# Patient Record
Sex: FEMALE | Race: WHITE | URBAN - METROPOLITAN AREA
[De-identification: names, ages, dates, MRNs, and addresses within clinical notes are randomized per-mention and may not be internally consistent; named-entity substitution may affect disease eponyms.]

---

## 2017-09-14 PROCEDURE — 87086 URINE CULTURE/COLONY COUNT: CPT | Performed by: OBSTETRICS & GYNECOLOGY

## 2017-09-14 PROCEDURE — 87340 HEPATITIS B SURFACE AG IA: CPT | Performed by: OBSTETRICS & GYNECOLOGY

## 2017-09-14 PROCEDURE — 86900 BLOOD TYPING SEROLOGIC ABO: CPT | Performed by: OBSTETRICS & GYNECOLOGY

## 2017-09-14 PROCEDURE — 86901 BLOOD TYPING SEROLOGIC RH(D): CPT | Performed by: OBSTETRICS & GYNECOLOGY

## 2017-09-14 PROCEDURE — 86762 RUBELLA ANTIBODY: CPT | Performed by: OBSTETRICS & GYNECOLOGY

## 2017-09-14 PROCEDURE — 86850 RBC ANTIBODY SCREEN: CPT | Performed by: OBSTETRICS & GYNECOLOGY

## 2017-09-14 PROCEDURE — 87389 HIV-1 AG W/HIV-1&-2 AB AG IA: CPT | Performed by: OBSTETRICS & GYNECOLOGY

## 2017-09-14 PROCEDURE — 85025 COMPLETE CBC W/AUTO DIFF WBC: CPT | Performed by: OBSTETRICS & GYNECOLOGY

## 2017-09-14 PROCEDURE — 36415 COLL VENOUS BLD VENIPUNCTURE: CPT | Performed by: OBSTETRICS & GYNECOLOGY

## 2017-09-14 PROCEDURE — 87491 CHLMYD TRACH DNA AMP PROBE: CPT | Performed by: OBSTETRICS & GYNECOLOGY

## 2017-09-14 PROCEDURE — 87186 SC STD MICRODIL/AGAR DIL: CPT | Performed by: OBSTETRICS & GYNECOLOGY

## 2017-09-14 PROCEDURE — 87088 URINE BACTERIA CULTURE: CPT | Performed by: OBSTETRICS & GYNECOLOGY

## 2017-09-14 PROCEDURE — 86780 TREPONEMA PALLIDUM: CPT | Performed by: OBSTETRICS & GYNECOLOGY

## 2017-09-14 PROCEDURE — 87591 N.GONORRHOEAE DNA AMP PROB: CPT | Performed by: OBSTETRICS & GYNECOLOGY

## 2017-11-11 PROBLEM — Z34.00 SUPERVISION OF NORMAL FIRST PREGNANCY, ANTEPARTUM: Status: ACTIVE | Noted: 2017-11-11

## 2017-11-11 PROBLEM — Z87.440 H/O URINARY TRACT INFECTION: Status: ACTIVE | Noted: 2017-11-11

## 2017-11-11 PROBLEM — Z34.00 SUPERVISION OF NORMAL FIRST PREGNANCY, ANTEPARTUM (HCC): Status: ACTIVE | Noted: 2017-11-11

## 2017-11-11 PROBLEM — F41.9 ANXIETY: Status: ACTIVE | Noted: 2017-11-11

## 2017-11-11 PROCEDURE — 87086 URINE CULTURE/COLONY COUNT: CPT | Performed by: OBSTETRICS & GYNECOLOGY

## 2018-01-23 PROCEDURE — 36415 COLL VENOUS BLD VENIPUNCTURE: CPT | Performed by: OBSTETRICS & GYNECOLOGY

## 2018-01-23 PROCEDURE — 82950 GLUCOSE TEST: CPT | Performed by: OBSTETRICS & GYNECOLOGY

## 2018-02-19 PROCEDURE — 85025 COMPLETE CBC W/AUTO DIFF WBC: CPT | Performed by: OBSTETRICS & GYNECOLOGY

## 2018-02-19 PROCEDURE — 86780 TREPONEMA PALLIDUM: CPT | Performed by: OBSTETRICS & GYNECOLOGY

## 2018-02-19 PROCEDURE — 87389 HIV-1 AG W/HIV-1&-2 AB AG IA: CPT | Performed by: OBSTETRICS & GYNECOLOGY

## 2018-02-19 PROCEDURE — 36415 COLL VENOUS BLD VENIPUNCTURE: CPT | Performed by: OBSTETRICS & GYNECOLOGY

## 2018-04-04 PROBLEM — O99.013 ANEMIA AFFECTING PREGNANCY IN THIRD TRIMESTER: Status: ACTIVE | Noted: 2018-04-04

## 2018-04-04 PROBLEM — O99.013 ANEMIA AFFECTING PREGNANCY IN THIRD TRIMESTER (HCC): Status: ACTIVE | Noted: 2018-04-04

## 2018-04-04 PROCEDURE — 87653 STREP B DNA AMP PROBE: CPT | Performed by: OBSTETRICS & GYNECOLOGY

## 2018-04-04 PROCEDURE — 87081 CULTURE SCREEN ONLY: CPT | Performed by: OBSTETRICS & GYNECOLOGY

## 2018-04-23 PROBLEM — Z34.00 SUPERVISION OF NORMAL FIRST PREGNANCY, ANTEPARTUM: Status: RESOLVED | Noted: 2017-11-11 | Resolved: 2018-04-19

## 2018-04-23 PROBLEM — O99.013 ANEMIA AFFECTING PREGNANCY IN THIRD TRIMESTER: Status: RESOLVED | Noted: 2018-04-04 | Resolved: 2018-04-19

## 2018-04-23 PROBLEM — O99.013 ANEMIA AFFECTING PREGNANCY IN THIRD TRIMESTER (HCC): Status: RESOLVED | Noted: 2018-04-04 | Resolved: 2018-04-19

## 2018-04-23 PROBLEM — Z34.00 SUPERVISION OF NORMAL FIRST PREGNANCY, ANTEPARTUM (HCC): Status: RESOLVED | Noted: 2017-11-11 | Resolved: 2018-04-19

## 2021-02-10 PROBLEM — R25.1 TREMOR: Status: ACTIVE | Noted: 2021-02-10

## 2021-02-10 PROBLEM — J38.3 VOCAL CORD DYSFUNCTION: Status: ACTIVE | Noted: 2021-02-10

## 2021-02-10 PROBLEM — J45.909 ASTHMA: Status: ACTIVE | Noted: 2021-02-10

## 2021-02-10 PROBLEM — R59.0 LOCALIZED ENLARGED LYMPH NODES: Status: ACTIVE | Noted: 2021-02-10

## 2021-02-10 PROBLEM — K21.9 GERD (GASTROESOPHAGEAL REFLUX DISEASE): Status: RESOLVED | Noted: 2021-02-10 | Resolved: 2021-02-10

## 2021-02-10 PROBLEM — M54.50 LOWER BACK PAIN: Status: ACTIVE | Noted: 2021-02-10

## 2021-02-10 PROBLEM — E83.19 IRON OVERLOAD: Status: ACTIVE | Noted: 2021-02-10

## 2021-02-10 PROBLEM — K21.9 GERD (GASTROESOPHAGEAL REFLUX DISEASE): Status: ACTIVE | Noted: 2021-02-10

## 2021-02-10 PROBLEM — R25.9 ABNORMAL INVOLUNTARY MOVEMENT: Status: ACTIVE | Noted: 2021-02-10

## 2021-02-10 PROBLEM — R00.2 PALPITATIONS: Status: ACTIVE | Noted: 2021-02-10

## 2021-02-10 PROBLEM — N92.6 ABNORMAL MENSTRUAL CYCLE: Status: ACTIVE | Noted: 2021-02-10

## 2021-02-10 PROBLEM — E55.9 VITAMIN D DEFICIENCY: Status: ACTIVE | Noted: 2021-02-10

## 2021-02-10 PROBLEM — M94.0 COSTAL CHONDRITIS: Status: ACTIVE | Noted: 2021-02-10

## 2021-02-10 PROBLEM — J45.909 ASTHMA (HCC): Status: ACTIVE | Noted: 2021-02-10

## 2021-08-26 ENCOUNTER — APPOINTMENT (OUTPATIENT)
Dept: GENERAL RADIOLOGY | Facility: HOSPITAL | Age: 26
End: 2021-08-26
Payer: COMMERCIAL

## 2021-08-26 ENCOUNTER — HOSPITAL ENCOUNTER (EMERGENCY)
Facility: HOSPITAL | Age: 26
Discharge: HOME | End: 2021-08-26
Payer: COMMERCIAL

## 2021-08-26 VITALS
WEIGHT: 125 LBS | SYSTOLIC BLOOD PRESSURE: 109 MMHG | HEART RATE: 70 BPM | DIASTOLIC BLOOD PRESSURE: 74 MMHG | BODY MASS INDEX: 23 KG/M2 | TEMPERATURE: 98 F | RESPIRATION RATE: 23 BRPM | OXYGEN SATURATION: 98 %

## 2021-08-26 DIAGNOSIS — R00.0 TACHYCARDIA: Primary | ICD-10-CM

## 2021-08-26 PROCEDURE — 93005 ELECTROCARDIOGRAM TRACING: CPT

## 2021-08-26 PROCEDURE — 99284 EMERGENCY DEPT VISIT MOD MDM: CPT

## 2021-08-26 PROCEDURE — 93010 ELECTROCARDIOGRAM REPORT: CPT | Performed by: INTERNAL MEDICINE

## 2021-08-26 PROCEDURE — 71046 X-RAY EXAM CHEST 2 VIEWS: CPT

## 2021-08-26 NOTE — ED INITIAL ASSESSMENT (HPI)
Patient states she was awoken from her slumber two days ago with a fast heart rate and anxiety, states she was seen at her doctor's office and has had anxiety ever since

## 2021-08-26 NOTE — ED QUICK NOTES
Patient c/o palpitations waking her out of sleep 2 days ago. Saw her pcp after and was told she had a \"septal infarct but was probably a false positive\" and told to get it \"further evaluated. \" states palpitations resolved but still feeling anxious.  [place]

## 2021-08-26 NOTE — ED PROVIDER NOTES
Patient Seen in: [facility] Emergency Department      History   Patient presents with:  Chest Pain Angina    Stated Complaint: cp, saw pcp for same yesterday, ekg done in office     HPI/Subjective:   HPI    62-year-old female with past medical his above.    Physical Exam     ED Triage Vitals   BP 08/26/21 1032 129/83   Pulse 08/26/21 1032 76   Resp 08/26/21 1032 18   Temp 08/26/21 1032 98 °F (36.7 °C)   Temp src --    SpO2 08/26/21 1032 98 %   O2 Device 08/26/21 1117 None (Room air)       Current:BP patient and she is comfortable with discharge at this time and follow-up with her primary care physician.                              Disposition and Plan     Clinical Impression:  Tachycardia  (primary encounter diagnosis)     Disposition:  Discharge  8/2

## 2022-07-21 ENCOUNTER — HOSPITAL ENCOUNTER (OUTPATIENT)
Age: 27
Discharge: HOME | End: 2022-07-21
Attending: EMERGENCY MEDICINE
Payer: COMMERCIAL

## 2022-07-21 ENCOUNTER — APPOINTMENT (OUTPATIENT)
Dept: GENERAL RADIOLOGY | Age: 27
End: 2022-07-21
Attending: EMERGENCY MEDICINE
Payer: COMMERCIAL

## 2022-07-21 VITALS
HEART RATE: 96 BPM | RESPIRATION RATE: 20 BRPM | OXYGEN SATURATION: 100 % | TEMPERATURE: 98 F | DIASTOLIC BLOOD PRESSURE: 97 MMHG | SYSTOLIC BLOOD PRESSURE: 139 MMHG

## 2022-07-21 DIAGNOSIS — R05.2 SUBACUTE COUGH: Primary | ICD-10-CM

## 2022-07-21 DIAGNOSIS — N63.20 MASS OF LEFT BREAST, UNSPECIFIED QUADRANT: ICD-10-CM

## 2022-07-21 LAB
#MXD IC: 0.9 X10ˆ3/UL (ref 0.1–1)
BUN BLD-MCNC: 5 MG/DL (ref 7–18)
CHLORIDE BLD-SCNC: 105 MMOL/L (ref 98–112)
CO2 BLD-SCNC: 24 MMOL/L (ref 21–32)
CREAT BLD-MCNC: 0.8 MG/DL
DDIMER WHOLE BLOOD: <200 NG/ML DDU (ref ?–400)
GLUCOSE BLD-MCNC: 90 MG/DL (ref 70–99)
HCT VFR BLD AUTO: 41.9 %
HCT VFR BLD CALC: 42 %
HGB BLD-MCNC: 14.4 G/DL
ISTAT IONIZED CALCIUM FOR CHEM 8: 1.19 MMOL/L (ref 1.12–1.32)
LYMPHOCYTES # BLD AUTO: 1.2 X10ˆ3/UL (ref 1–4)
LYMPHOCYTES NFR BLD AUTO: 18.3 %
MCH RBC QN AUTO: 30.6 PG (ref 26–34)
MCHC RBC AUTO-ENTMCNC: 34.4 G/DL (ref 31–37)
MCV RBC AUTO: 89 FL (ref 80–100)
MIXED CELL %: 14.7 %
NEUTROPHILS # BLD AUTO: 4.3 X10ˆ3/UL (ref 1.5–7.7)
NEUTROPHILS NFR BLD AUTO: 67 %
PLATELET # BLD AUTO: 183 X10ˆ3/UL (ref 150–450)
POTASSIUM BLD-SCNC: 3.8 MMOL/L (ref 3.6–5.1)
RBC # BLD AUTO: 4.71 X10ˆ6/UL
SODIUM BLD-SCNC: 141 MMOL/L (ref 136–145)
TROPONIN I BLD-MCNC: <0.02 NG/ML
WBC # BLD AUTO: 6.4 X10ˆ3/UL (ref 4–11)

## 2022-07-21 PROCEDURE — 85378 FIBRIN DEGRADE SEMIQUANT: CPT | Performed by: EMERGENCY MEDICINE

## 2022-07-21 PROCEDURE — 71046 X-RAY EXAM CHEST 2 VIEWS: CPT | Performed by: EMERGENCY MEDICINE

## 2022-07-21 PROCEDURE — 36415 COLL VENOUS BLD VENIPUNCTURE: CPT

## 2022-07-21 PROCEDURE — 85025 COMPLETE CBC W/AUTO DIFF WBC: CPT | Performed by: EMERGENCY MEDICINE

## 2022-07-21 PROCEDURE — 80047 BASIC METABLC PNL IONIZED CA: CPT

## 2022-07-21 PROCEDURE — 84484 ASSAY OF TROPONIN QUANT: CPT

## 2022-07-21 PROCEDURE — 93005 ELECTROCARDIOGRAM TRACING: CPT

## 2022-07-21 PROCEDURE — 99214 OFFICE O/P EST MOD 30 MIN: CPT

## 2022-07-21 PROCEDURE — 93010 ELECTROCARDIOGRAM REPORT: CPT | Performed by: EMERGENCY MEDICINE

## 2022-07-21 PROCEDURE — 99215 OFFICE O/P EST HI 40 MIN: CPT

## 2022-07-21 RX ORDER — BENZONATATE 100 MG/1
100 CAPSULE ORAL 3 TIMES DAILY PRN
Qty: 30 CAPSULE | Refills: 0 | Status: SHIPPED | OUTPATIENT
Start: 2022-07-21 | End: 2022-08-20

## 2022-07-22 ENCOUNTER — HOSPITAL ENCOUNTER (EMERGENCY)
Facility: HOSPITAL | Age: 27
Discharge: HOME | End: 2022-07-22
Payer: COMMERCIAL

## 2022-07-22 VITALS
RESPIRATION RATE: 20 BRPM | HEIGHT: 62 IN | DIASTOLIC BLOOD PRESSURE: 74 MMHG | OXYGEN SATURATION: 97 % | HEART RATE: 91 BPM | BODY MASS INDEX: 22.08 KG/M2 | TEMPERATURE: 98 F | WEIGHT: 120 LBS | SYSTOLIC BLOOD PRESSURE: 119 MMHG

## 2022-07-22 DIAGNOSIS — J98.01 BRONCHOSPASM: Primary | ICD-10-CM

## 2022-07-22 LAB — B-HCG UR QL: NEGATIVE

## 2022-07-22 PROCEDURE — 81025 URINE PREGNANCY TEST: CPT

## 2022-07-22 PROCEDURE — 99284 EMERGENCY DEPT VISIT MOD MDM: CPT

## 2022-07-22 PROCEDURE — 94644 CONT INHLJ TX 1ST HOUR: CPT

## 2022-07-22 RX ORDER — PREDNISONE 20 MG/1
60 TABLET ORAL ONCE
Status: COMPLETED | OUTPATIENT
Start: 2022-07-22 | End: 2022-07-22

## 2022-07-22 RX ORDER — ALBUTEROL SULFATE 90 UG/1
2 AEROSOL, METERED RESPIRATORY (INHALATION) EVERY 4 HOURS PRN
Qty: 1 EACH | Refills: 0 | Status: SHIPPED | OUTPATIENT
Start: 2022-07-22 | End: 2022-08-21

## 2022-07-22 RX ORDER — PREDNISONE 20 MG/1
40 TABLET ORAL DAILY
Qty: 10 TABLET | Refills: 0 | Status: SHIPPED | OUTPATIENT
Start: 2022-07-22 | End: 2022-07-27

## 2022-07-22 RX ORDER — AZITHROMYCIN 250 MG/1
TABLET, FILM COATED ORAL
Qty: 6 TABLET | Refills: 0 | Status: SHIPPED | OUTPATIENT
Start: 2022-07-22 | End: 2022-07-27

## 2022-07-22 NOTE — ED PROVIDER NOTES
I reviewed that chart and discussed the case. I have examined the patient and noted nontoxic,well-appearing patient with trace wheezing resting comfortably and voicing profound symptomatic improvement versus initial presentation. 0413: Resting comfortably, amenable to discharge with symptomatic care and primary care follow-up. I agree with the following clinical impression(s):  Bronchospasm  (primary encounter diagnosis). I agree with the plan as noted.

## 2022-07-22 NOTE — ED INITIAL ASSESSMENT (HPI)
Patient arrives ambulatory through triage with c/o of  Cough, URI symptoms for the last 2 days. Patient states she was at urgent care earlier.

## 2023-01-25 ENCOUNTER — HOSPITAL ENCOUNTER (OUTPATIENT)
Dept: CT IMAGING | Age: 28
Discharge: HOME | End: 2023-01-25
Attending: NURSE PRACTITIONER
Payer: COMMERCIAL

## 2023-01-25 ENCOUNTER — HOSPITAL ENCOUNTER (OUTPATIENT)
Age: 28
Discharge: HOME | End: 2023-01-25
Payer: COMMERCIAL

## 2023-01-25 VITALS
DIASTOLIC BLOOD PRESSURE: 57 MMHG | TEMPERATURE: 98 F | HEART RATE: 87 BPM | SYSTOLIC BLOOD PRESSURE: 119 MMHG | OXYGEN SATURATION: 100 % | RESPIRATION RATE: 20 BRPM

## 2023-01-25 DIAGNOSIS — R51.9 LEFT-SIDED HEADACHE: Primary | ICD-10-CM

## 2023-01-25 LAB
#MXD IC: 1.3 X10ˆ3/UL (ref 0.1–1)
B-HCG UR QL: NEGATIVE
BUN BLD-MCNC: 14 MG/DL (ref 7–18)
CHLORIDE BLD-SCNC: 103 MMOL/L (ref 98–112)
CO2 BLD-SCNC: 24 MMOL/L (ref 21–32)
CREAT BLD-MCNC: 0.9 MG/DL
DDIMER WHOLE BLOOD: <200 NG/ML DDU (ref ?–400)
GFR SERPLBLD BASED ON 1.73 SQ M-ARVRAT: 91 ML/MIN/1.73M2 (ref 60–?)
GLUCOSE BLD-MCNC: 86 MG/DL (ref 70–99)
HCT VFR BLD AUTO: 39.1 %
HCT VFR BLD CALC: 41 %
HGB BLD-MCNC: 13.4 G/DL
ISTAT IONIZED CALCIUM FOR CHEM 8: 1.19 MMOL/L (ref 1.12–1.32)
LYMPHOCYTES # BLD AUTO: 2 X10ˆ3/UL (ref 1–4)
LYMPHOCYTES NFR BLD AUTO: 26.1 %
MCH RBC QN AUTO: 30.2 PG (ref 26–34)
MCHC RBC AUTO-ENTMCNC: 34.3 G/DL (ref 31–37)
MCV RBC AUTO: 88.1 FL (ref 80–100)
MIXED CELL %: 16.5 %
NEUTROPHILS # BLD AUTO: 4.5 X10ˆ3/UL (ref 1.5–7.7)
NEUTROPHILS NFR BLD AUTO: 57.4 %
PLATELET # BLD AUTO: 236 X10ˆ3/UL (ref 150–450)
POTASSIUM BLD-SCNC: 3.7 MMOL/L (ref 3.6–5.1)
RBC # BLD AUTO: 4.44 X10ˆ6/UL
SARS-COV-2 RNA RESP QL NAA+PROBE: NOT DETECTED
SODIUM BLD-SCNC: 140 MMOL/L (ref 136–145)
TROPONIN I BLD-MCNC: <0.02 NG/ML
WBC # BLD AUTO: 7.8 X10ˆ3/UL (ref 4–11)

## 2023-01-25 PROCEDURE — 99214 OFFICE O/P EST MOD 30 MIN: CPT

## 2023-01-25 PROCEDURE — 80047 BASIC METABLC PNL IONIZED CA: CPT

## 2023-01-25 PROCEDURE — 93005 ELECTROCARDIOGRAM TRACING: CPT

## 2023-01-25 PROCEDURE — 85378 FIBRIN DEGRADE SEMIQUANT: CPT | Performed by: NURSE PRACTITIONER

## 2023-01-25 PROCEDURE — 84484 ASSAY OF TROPONIN QUANT: CPT

## 2023-01-25 PROCEDURE — 85025 COMPLETE CBC W/AUTO DIFF WBC: CPT | Performed by: NURSE PRACTITIONER

## 2023-01-25 PROCEDURE — 36415 COLL VENOUS BLD VENIPUNCTURE: CPT

## 2023-01-25 PROCEDURE — 99215 OFFICE O/P EST HI 40 MIN: CPT

## 2023-01-25 PROCEDURE — 81025 URINE PREGNANCY TEST: CPT

## 2023-01-25 PROCEDURE — 70450 CT HEAD/BRAIN W/O DYE: CPT | Performed by: NURSE PRACTITIONER

## 2023-01-25 PROCEDURE — 93010 ELECTROCARDIOGRAM REPORT: CPT

## 2023-01-25 NOTE — ED INITIAL ASSESSMENT (HPI)
Left sided neck stiffness with headache for 2 days, heart palpitations, no sob, no fever, chills and feeling fatigued, no cough, no runny nose

## 2023-01-26 LAB
ATRIAL RATE: 98 BPM
P AXIS: 47 DEGREES
P-R INTERVAL: 112 MS
Q-T INTERVAL: 336 MS
QRS DURATION: 82 MS
QTC CALCULATION (BEZET): 428 MS
R AXIS: 50 DEGREES
T AXIS: 6 DEGREES
VENTRICULAR RATE: 98 BPM

## 2023-02-20 ENCOUNTER — HOSPITAL ENCOUNTER (OUTPATIENT)
Age: 28
Discharge: HOME | End: 2023-02-20
Payer: COMMERCIAL

## 2023-02-20 VITALS
HEART RATE: 83 BPM | DIASTOLIC BLOOD PRESSURE: 62 MMHG | OXYGEN SATURATION: 99 % | TEMPERATURE: 98 F | SYSTOLIC BLOOD PRESSURE: 102 MMHG | RESPIRATION RATE: 18 BRPM

## 2023-02-20 DIAGNOSIS — J02.0 STREP PHARYNGITIS: Primary | ICD-10-CM

## 2023-02-20 LAB — S PYO AG THROAT QL IA.RAPID: POSITIVE

## 2023-02-20 PROCEDURE — 87651 STREP A DNA AMP PROBE: CPT | Performed by: EMERGENCY MEDICINE

## 2023-02-20 PROCEDURE — 99213 OFFICE O/P EST LOW 20 MIN: CPT

## 2023-02-20 RX ORDER — PENICILLIN V POTASSIUM 500 MG/1
500 TABLET ORAL 2 TIMES DAILY
Qty: 20 TABLET | Refills: 0 | Status: SHIPPED | OUTPATIENT
Start: 2023-02-20 | End: 2023-03-02

## 2023-02-21 NOTE — DISCHARGE INSTRUCTIONS
Start the antibiotic as prescribed and finish it completely. After 24 hours get a new toothbrush so you do not reinfect yourself. Take ibuprofen or Tylenol for pain. Drink plenty of fluids eat as tolerated. Follow-up with your primary care doctor as needed.

## 2023-07-04 ENCOUNTER — HOSPITAL ENCOUNTER (OUTPATIENT)
Age: 28
Discharge: HOME | End: 2023-07-04
Payer: COMMERCIAL

## 2023-07-04 VITALS
OXYGEN SATURATION: 99 % | RESPIRATION RATE: 18 BRPM | DIASTOLIC BLOOD PRESSURE: 83 MMHG | HEART RATE: 107 BPM | SYSTOLIC BLOOD PRESSURE: 106 MMHG | TEMPERATURE: 98 F

## 2023-07-04 DIAGNOSIS — J02.9 VIRAL PHARYNGITIS: ICD-10-CM

## 2023-07-04 DIAGNOSIS — H92.02 LEFT EAR PAIN: Primary | ICD-10-CM

## 2023-07-04 LAB — S PYO AG THROAT QL: NEGATIVE

## 2023-07-04 PROCEDURE — 99213 OFFICE O/P EST LOW 20 MIN: CPT | Performed by: NURSE PRACTITIONER

## 2023-07-04 PROCEDURE — 87880 STREP A ASSAY W/OPTIC: CPT | Performed by: NURSE PRACTITIONER

## 2023-07-04 NOTE — ED INITIAL ASSESSMENT (HPI)
Left ear pain started today. No injury no trauma. Pain radiating to left side neck. No con getion. No fever.

## 2023-08-09 ENCOUNTER — HOSPITAL ENCOUNTER (OUTPATIENT)
Age: 28
Discharge: HOME | End: 2023-08-09
Payer: COMMERCIAL

## 2023-08-09 VITALS
OXYGEN SATURATION: 98 % | TEMPERATURE: 98 F | HEART RATE: 91 BPM | RESPIRATION RATE: 18 BRPM | SYSTOLIC BLOOD PRESSURE: 128 MMHG | DIASTOLIC BLOOD PRESSURE: 78 MMHG

## 2023-08-09 DIAGNOSIS — J02.0 ACUTE STREPTOCOCCAL PHARYNGITIS: Primary | ICD-10-CM

## 2023-08-09 DIAGNOSIS — Z87.09 HISTORY OF STREPTOCOCCAL SORE THROAT: ICD-10-CM

## 2023-08-09 LAB
S PYO AG THROAT QL: POSITIVE
SARS-COV-2 RNA RESP QL NAA+PROBE: NOT DETECTED

## 2023-08-09 PROCEDURE — U0002 COVID-19 LAB TEST NON-CDC: HCPCS | Performed by: PHYSICIAN ASSISTANT

## 2023-08-09 PROCEDURE — 99213 OFFICE O/P EST LOW 20 MIN: CPT | Performed by: PHYSICIAN ASSISTANT

## 2023-08-09 PROCEDURE — 87880 STREP A ASSAY W/OPTIC: CPT | Performed by: PHYSICIAN ASSISTANT

## 2023-08-09 RX ORDER — METHYLPREDNISOLONE 4 MG/1
TABLET ORAL
Qty: 1 EACH | Refills: 0 | Status: SHIPPED | OUTPATIENT
Start: 2023-08-09

## 2023-08-09 RX ORDER — AMOXICILLIN AND CLAVULANATE POTASSIUM 875; 125 MG/1; MG/1
1 TABLET, FILM COATED ORAL 2 TIMES DAILY
Qty: 20 TABLET | Refills: 0 | Status: SHIPPED | OUTPATIENT
Start: 2023-08-09 | End: 2023-08-19

## 2023-11-19 ENCOUNTER — HOSPITAL ENCOUNTER (OUTPATIENT)
Age: 28
Discharge: HOME | End: 2023-11-19
Payer: COMMERCIAL

## 2023-11-19 VITALS
OXYGEN SATURATION: 100 % | TEMPERATURE: 99 F | RESPIRATION RATE: 16 BRPM | DIASTOLIC BLOOD PRESSURE: 71 MMHG | HEART RATE: 70 BPM | SYSTOLIC BLOOD PRESSURE: 117 MMHG

## 2023-11-19 DIAGNOSIS — J02.0 ACUTE STREPTOCOCCAL PHARYNGITIS: Primary | ICD-10-CM

## 2023-11-19 LAB — S PYO AG THROAT QL IA.RAPID: POSITIVE

## 2023-11-19 PROCEDURE — 99213 OFFICE O/P EST LOW 20 MIN: CPT

## 2023-11-19 PROCEDURE — 87651 STREP A DNA AMP PROBE: CPT | Performed by: PHYSICIAN ASSISTANT

## 2023-11-19 RX ORDER — AMOXICILLIN 250 MG/5ML
500 POWDER, FOR SUSPENSION ORAL 2 TIMES DAILY
Qty: 200 ML | Refills: 0 | Status: SHIPPED | OUTPATIENT
Start: 2023-11-19 | End: 2023-11-29

## 2023-11-19 RX ORDER — BENZOCAINE/MENTH/CETYLPYRD CL 15 MG-2 MG
1 LOZENGE MUCOUS MEMBRANE EVERY 6 HOURS PRN
Qty: 20 LOZENGE | Refills: 0 | Status: SHIPPED | OUTPATIENT
Start: 2023-11-19 | End: 2023-12-09

## 2024-02-27 ENCOUNTER — HOSPITAL ENCOUNTER (OUTPATIENT)
Age: 29
Discharge: HOME | End: 2024-02-27

## 2024-02-27 VITALS
OXYGEN SATURATION: 99 % | DIASTOLIC BLOOD PRESSURE: 90 MMHG | RESPIRATION RATE: 18 BRPM | HEART RATE: 90 BPM | SYSTOLIC BLOOD PRESSURE: 127 MMHG | TEMPERATURE: 98 F

## 2024-02-27 DIAGNOSIS — J02.0 STREP PHARYNGITIS: Primary | ICD-10-CM

## 2024-02-27 LAB — S PYO AG THROAT QL IA.RAPID: POSITIVE

## 2024-02-27 PROCEDURE — 99213 OFFICE O/P EST LOW 20 MIN: CPT

## 2024-02-27 PROCEDURE — 87651 STREP A DNA AMP PROBE: CPT | Performed by: PHYSICIAN ASSISTANT

## 2024-02-27 PROCEDURE — 99214 OFFICE O/P EST MOD 30 MIN: CPT

## 2024-02-27 RX ORDER — AMOXICILLIN 400 MG/5ML
500 POWDER, FOR SUSPENSION ORAL 3 TIMES DAILY
Qty: 180 ML | Refills: 0 | Status: SHIPPED | OUTPATIENT
Start: 2024-02-27 | End: 2024-03-08

## 2024-02-27 RX ORDER — BENZOCAINE AND MENTHOL, UNSPECIFIED FORM 15; 2.3 MG/1; MG/1
1 LOZENGE ORAL 3 TIMES DAILY PRN
Qty: 16 LOZENGE | Refills: 0 | Status: SHIPPED | OUTPATIENT
Start: 2024-02-27

## 2024-02-27 RX ORDER — DEXAMETHASONE SODIUM PHOSPHATE 10 MG/ML
10 INJECTION, SOLUTION INTRAMUSCULAR; INTRAVENOUS ONCE
Status: COMPLETED | OUTPATIENT
Start: 2024-02-27 | End: 2024-02-27

## 2024-02-27 NOTE — ED INITIAL ASSESSMENT (HPI)
Patient arrived ambulatory to room c/o symptoms that started this morning. +sore throat. No fevers. No n/v/d. Easy non labored respirations. No distress.

## 2024-02-27 NOTE — ED PROVIDER NOTES
Chief Complaint   Patient presents with    Allergies     Left tonsil swollen, believe it is strep throat. - Entered by patient    Sore Throat       HPI:     Ne Calzada is a 28 year old female who presents for evaluation of sore throat onset overnight.  Notes associated hoarseness of voice.  Notes similar history of symptoms 3 months ago treated by confirmed strep results with amoxicillin with full course and full resolution of symptoms.  Denies history of recurrent strep otherwise or sick contacts including influenza or coronavirus.  Denies history of mono or recent exposure.  Denies associated headache nasal congestion earache dysphagia neck pain chest pain shortness of breath abdominal pain vomiting diarrhea dysuria or rash.      PFSH    PFSH asessment screens reviewed and agree.  Nurses notes reviewed I agree with documentation.    Family History   Problem Relation Age of Onset    Hypertension Father     Diabetes Father     Cancer Maternal Grandmother         colon    Cancer Paternal Grandmother         breast cancer in her 80s    Actinic Keratosis Mother     Other (GERD) Mother      Family history reviewed with patient/caregiver and is not pertinent to presenting problem.  Social History     Socioeconomic History    Marital status: Single     Spouse name: Not on file    Number of children: Not on file    Years of education: Not on file    Highest education level: Not on file   Occupational History    Not on file   Tobacco Use    Smoking status: Former     Passive exposure: Never    Smokeless tobacco: Never   Vaping Use    Vaping Use: Former   Substance and Sexual Activity    Alcohol use: No    Drug use: No    Sexual activity: Not on file   Other Topics Concern     Service Not Asked    Blood Transfusions Not Asked    Caffeine Concern Not Asked    Occupational Exposure Not Asked    Hobby Hazards Not Asked    Sleep Concern Not Asked    Stress Concern Not Asked    Weight Concern Not Asked    Special Diet  Not Asked    Back Care Not Asked    Exercise Not Asked    Bike Helmet Not Asked    Seat Belt Yes    Self-Exams Not Asked   Social History Narrative    Not on file     Social Determinants of Health     Financial Resource Strain: Not on file   Food Insecurity: Not on file   Transportation Needs: Not on file   Physical Activity: Not on file   Stress: Not on file   Social Connections: Not on file   Housing Stability: Not on file         ROS:   Positive for stated complaint: Sore throat.  Hoarseness.  All other systems reviewed and negative except as noted above.  Constitutional and Vital Signs Reviewed.      Physical Exam:     Findings:    /90   Pulse 90   Temp 97.8 °F (36.6 °C) (Temporal)   Resp 18   LMP 11/19/2023 (Exact Date)   SpO2 99%   GENERAL: well developed, well nourished, well hydrated, no distress  SKIN: good skin turgor, no obvious rashes  NECK: supple, no cervical lymphadenopathy or nuchal rigidity.  EXTREMITIES: no cyanosis or edema. GORDILLO without difficulty  HEAD: normocephalic, atraumatic  EYES: sclera non icteric bilateral, conjunctiva clear  EARS: TMs clear bilaterally. Canals clear.  NOSE: nasal turbinates: pink, normal mucosa  THROAT: Mild erythema posterior pharynx, tonsils +2 of 4 bilaterally.  Exudate right tonsil.  No PTA.   uvula midline, and airway patent  LUNGS: clear to auscultation bilaterally; no rales, rhonchi, or wheezes  NEURO: No focal deficits  PSYCH: Alert and oriented x3.  Answering questions appropriately.  Mood appropriate.    MDM/Assessment/Plan:   Orders for this encounter:    Orders Placed This Encounter    Rapid Strep A - ID NOW     Order Specific Question:   Release to patient     Answer:   Immediate    dexAMETHasone PF (Decadron) 10 MG/ML injection 10 mg    Benzocaine-Menthol (CEPACOL) 15-2.3 MG Mouth/Throat Lozenge     Sig: Use as directed 1 lozenge in the mouth or throat 3 (three) times daily as needed.     Dispense:  16 lozenge     Refill:  0    Amoxicillin 400  MG/5ML Oral Recon Susp     Sig: Take 6 mL (480 mg total) by mouth 3 (three) times daily for 10 days.     Dispense:  180 mL     Refill:  0       Labs performed this visit:  Recent Results (from the past 10 hour(s))   Rapid Strep A - ID NOW    Collection Time: 02/27/24  3:26 PM    Specimen: Throat; Other   Result Value Ref Range    Strep A by PCR Positive (A) Negative       MDM:  Patient given 1 dose of dexamethasone for support of tonsillitis as well as early laryngitis.  Patient strep positive.  Patient agrees with further antibiotics with previous use amoxicillin and to follow-up outpatient in the days ahead with supportive agents by recommendation.  Happy with plan of care    Diagnosis:    ICD-10-CM    1. Strep pharyngitis  J02.0           All results reviewed and discussed with patient.  See AVS for detailed discharge instructions for your condition today.    Follow Up with:  Yoanna Craig DO  [address] [facility]  [place] [address]  [phone]    Schedule an appointment as soon as possible for a visit in 3 days  As needed, If symptoms worsen

## 2024-07-25 ENCOUNTER — HOSPITAL ENCOUNTER (OUTPATIENT)
Age: 29
Discharge: HOME | End: 2024-07-25
Payer: COMMERCIAL

## 2024-07-25 VITALS
BODY MASS INDEX: 22 KG/M2 | SYSTOLIC BLOOD PRESSURE: 119 MMHG | WEIGHT: 121.25 LBS | RESPIRATION RATE: 18 BRPM | OXYGEN SATURATION: 100 % | HEART RATE: 92 BPM | DIASTOLIC BLOOD PRESSURE: 71 MMHG | TEMPERATURE: 99 F

## 2024-07-25 DIAGNOSIS — J02.0 STREP PHARYNGITIS: Primary | ICD-10-CM

## 2024-07-25 LAB — S PYO AG THROAT QL IA.RAPID: POSITIVE

## 2024-07-25 PROCEDURE — 99213 OFFICE O/P EST LOW 20 MIN: CPT

## 2024-07-25 PROCEDURE — 87651 STREP A DNA AMP PROBE: CPT | Performed by: STUDENT IN AN ORGANIZED HEALTH CARE EDUCATION/TRAINING PROGRAM

## 2024-07-25 RX ORDER — AMOXICILLIN 400 MG/5ML
500 POWDER, FOR SUSPENSION ORAL 3 TIMES DAILY
Qty: 180 ML | Refills: 0 | Status: SHIPPED | OUTPATIENT
Start: 2024-07-25 | End: 2024-08-04

## 2024-07-25 NOTE — ED INITIAL ASSESSMENT (HPI)
Patient requesting strep test; reports discomfort in throat x 3 days.  Also reports white patches in throat.

## 2024-07-25 NOTE — ED PROVIDER NOTES
Patient Seen in: [facility]      History     Chief Complaint   Patient presents with    Sore Throat     Stated Complaint: Headache, sore throat, strep exposure    Subjective:   HPI    29-year-old female presents to immediate care with complaints of headache sore throat and strep throat exposure.  Patient is afebrile.  She states she does have a history of strep pharyngitis.    Objective:   Past Medical History:    Anxiety    was on rxn for only one month; has been ok since then    Asthma ([facility])    Gastrointestinal disorder    GERD; presents as wheezing as self reported per pt; she uses an inhaler    Miscarriage ([facility])    Vitamin D deficiency              History reviewed. No pertinent surgical history.             Social History     Socioeconomic History    Marital status: Single   Tobacco Use    Smoking status: Former     Passive exposure: Never    Smokeless tobacco: Never   Vaping Use    Vaping status: Former   Substance and Sexual Activity    Alcohol use: No    Drug use: No   Other Topics Concern    Seat Belt Yes     Social Determinants of Health      Received from [facility]              Review of Systems    Positive for stated Chief Complaint: Sore Throat    Other systems are as noted in HPI.  Constitutional and vital signs reviewed.      All other systems reviewed and negative except as noted above.    Physical Exam     ED Triage Vitals [07/25/24 1459]   /71   Pulse 92   Resp 18   Temp 98.7 °F (37.1 °C)   Temp src Temporal   SpO2 100 %   O2 Device None (Room air)       Current Vitals:   Vital Signs  BP: 119/71  Pulse: 92  Resp: 18  Temp: 98.7 °F (37.1 °C)  Temp src: Temporal    Oxygen Therapy  SpO2: 100 %  O2 Device: None (Room air)            Physical Exam  Vitals reviewed.   Constitutional:       General: She is not in acute distress.     Appearance: She is not ill-appearing.   HENT:      Right Ear: Tympanic membrane and ear canal normal.      Left Ear: Tympanic membrane  and ear canal normal.      Nose: No congestion or rhinorrhea.      Mouth/Throat:      Mouth: Mucous membranes are moist. No oral lesions.      Pharynx: Uvula midline. Pharyngeal swelling and posterior oropharyngeal erythema present. No oropharyngeal exudate or uvula swelling.      Tonsils: No tonsillar exudate or tonsillar abscesses.   Cardiovascular:      Rate and Rhythm: Normal rate and regular rhythm.   Pulmonary:      Effort: Pulmonary effort is normal.      Breath sounds: Normal breath sounds.   Musculoskeletal:      Cervical back: Normal range of motion and neck supple.   Lymphadenopathy:      Cervical: Cervical adenopathy present.   Skin:     General: Skin is warm.   Neurological:      General: No focal deficit present.      Mental Status: She is alert and oriented to person, place, and time.   Psychiatric:         Mood and Affect: Mood normal.         Behavior: Behavior normal.               ED Course     Labs Reviewed   RAPID STREP A - Abnormal; Notable for the following components:       Result Value    Strep A by PCR Positive (*)     All other components within normal limits                      MDM                                         Medical Decision Making  29-year-old female presents with sore throat.  Differential diagnosis includes viral pharyngitis versus strep pharyngitis versus peritonsillar abscess.  POC strep is positive.  She does have erythema posterior pharynx.  Right tonsil is larger than left.  She has no acute distress.  Uvula is midline.  Prescription for amoxicillin was sent to patient's pharmacy.  She declined printed instructions.    Amount and/or Complexity of Data Reviewed  Labs: ordered.     Details: POC strep is positive    Risk  OTC drugs.  Prescription drug management.        Disposition and Plan     Clinical Impression:  1. Strep pharyngitis         Disposition:  Discharge  7/25/2024  3:17 pm    Follow-up:  Yoanna Craig DO  [address] [place] [address]  [place]  [id number]  [phone]      If symptoms worsen          Medications Prescribed:  Discharge Medication List as of 7/25/2024  3:18 PM        START taking these medications    Details   Amoxicillin 400 MG/5ML Oral Recon Susp Take 6 mL (480 mg total) by mouth 3 (three) times daily for 10 days., Normal, Disp-180 mL, R-0

## 2024-11-04 ENCOUNTER — HOSPITAL ENCOUNTER (EMERGENCY)
Facility: HOSPITAL | Age: 29
Discharge: HOME | End: 2024-11-04
Attending: EMERGENCY MEDICINE
Payer: COMMERCIAL

## 2024-11-04 ENCOUNTER — APPOINTMENT (OUTPATIENT)
Dept: GENERAL RADIOLOGY | Facility: HOSPITAL | Age: 29
End: 2024-11-04
Attending: EMERGENCY MEDICINE
Payer: COMMERCIAL

## 2024-11-04 VITALS
HEART RATE: 102 BPM | OXYGEN SATURATION: 94 % | BODY MASS INDEX: 23 KG/M2 | TEMPERATURE: 99 F | RESPIRATION RATE: 28 BRPM | SYSTOLIC BLOOD PRESSURE: 114 MMHG | DIASTOLIC BLOOD PRESSURE: 83 MMHG | WEIGHT: 125 LBS

## 2024-11-04 DIAGNOSIS — J45.901 MODERATE ASTHMA WITH EXACERBATION, UNSPECIFIED WHETHER PERSISTENT (HCC): Primary | ICD-10-CM

## 2024-11-04 PROCEDURE — 94799 UNLISTED PULMONARY SVC/PX: CPT

## 2024-11-04 PROCEDURE — 99284 EMERGENCY DEPT VISIT MOD MDM: CPT

## 2024-11-04 PROCEDURE — 94644 CONT INHLJ TX 1ST HOUR: CPT

## 2024-11-04 PROCEDURE — 71045 X-RAY EXAM CHEST 1 VIEW: CPT | Performed by: EMERGENCY MEDICINE

## 2024-11-04 RX ORDER — PREDNISONE 20 MG/1
40 TABLET ORAL DAILY
Qty: 8 TABLET | Refills: 0 | Status: SHIPPED | OUTPATIENT
Start: 2024-11-04 | End: 2024-11-08

## 2024-11-04 RX ORDER — ALBUTEROL SULFATE 5 MG/ML
15 SOLUTION RESPIRATORY (INHALATION) ONCE
Status: COMPLETED | OUTPATIENT
Start: 2024-11-04 | End: 2024-11-04

## 2024-11-04 RX ORDER — PREDNISONE 20 MG/1
60 TABLET ORAL ONCE
Status: COMPLETED | OUTPATIENT
Start: 2024-11-04 | End: 2024-11-04

## 2024-11-04 NOTE — ED INITIAL ASSESSMENT (HPI)
Patient ambulatory to ED with complaint of asthma exacerbation. Audible wheezing.      Patient is AXOX4.

## 2024-11-04 NOTE — ED PROVIDER NOTES
Patient Seen in: [facility] Emergency Department    History     Chief Complaint   Patient presents with    Asthma       HPI    29-year-old female who presents to the emergency department given day of dyspnea, dry cough.  No chest pain or any fevers.  No abdominal pain or nausea or emesis.    History reviewed.   Past Medical History:    Anxiety    was on rxn for only one month; has been ok since then    Asthma (HCC)    Gastrointestinal disorder    GERD; presents as wheezing as self reported per pt; she uses an inhaler    Miscarriage (HCC)    Vitamin D deficiency       History reviewed. History reviewed. No pertinent surgical history.      Medications :  Prescriptions Prior to Admission[1]     Family History   Problem Relation Age of Onset    Hypertension Father     Diabetes Father     Cancer Maternal Grandmother         colon    Cancer Paternal Grandmother         breast cancer in her 80s    Actinic Keratosis Mother     Other (GERD) Mother        Smoking Status:   Social History     Socioeconomic History    Marital status: Single   Tobacco Use    Smoking status: Former     Passive exposure: Never    Smokeless tobacco: Never   Vaping Use    Vaping status: Former   Substance and Sexual Activity    Alcohol use: No    Drug use: No   Other Topics Concern    Seat Belt Yes       Constitutional and vital signs reviewed.      Social History and Family History elements reviewed from today, pertinent positives to the presenting problem noted.    Physical Exam     ED Triage Vitals [11/04/24 0851]   /83   Pulse 102   Resp (!) 28   Temp 98.8 °F (37.1 °C)   Temp src Temporal   SpO2 94 %   O2 Device None (Room air)       All measures to prevent infection transmission during my interaction with the patient were taken. The patient was already wearing a droplet mask on my arrival to the room. Personal protective equipment was worn throughout the duration of the exam.  Handwashing was performed prior to and after the  exam.  Stethoscope and any equipment used during my examination was cleaned with super sani-cloth germicidal wipes following the exam.     Physical Exam    General: NAD  Head: Normocephalic and atraumatic.  Mouth/Throat/Ears/Nose: Oropharynx is clear   Eyes: Conjunctivae and EOM are normal.   Neck: Normal range of motion. Supple.   Cardiovascular: Normal rate, regular rhythm, normal heart sounds.  Respiratory/Chest: wheezing in all lung fields. Trace tachypnea. No accessory muscle use. Exhibits no tenderness.  Gastrointestinal: Soft, non-tender, non-distended. Bowel sounds are normal.   Musculoskeletal:No swelling or deformity.   Neurological: Alert and appropriate. No focal deficits.   Skin: Skin is warm and dry. No pallor.  Psychiatric: Has a normal mood and affect.      ED Course      Labs Reviewed - No data to display    As Interpreted by me    Imaging Results Available and Reviewed while in ED: XR CHEST AP PORTABLE  (CPT=71045)    Result Date: 11/4/2024  CONCLUSION: No acute cardiopulmonary disease.    Dictated by (CST): Rory Cali MD on 11/04/2024 at 9:40 AM     Finalized by (CST): Rory Cali MD on 11/04/2024 at 9:41 AM         ED Medications Administered:   Medications   albuterol (Ventolin) (5 MG/ML) 0.5% nebulizer solution 15 mg (15 mg Nebulization Given 11/4/24 0906)   predniSONE (Deltasone) tab 60 mg (60 mg Oral Given 11/4/24 0902)         MDM     Vitals:    11/04/24 0851   BP: 114/83   Pulse: 102   Resp: (!) 28   Temp: 98.8 °F (37.1 °C)   TempSrc: Temporal   SpO2: 94%   Weight: 56.7 kg     *I personally reviewed and interpreted all ED vitals.    Pulse Ox: 94%, Room air, Normal     Monitor Interpretation:   normal sinus rhythm as interpreted by me.  The cardiac monitor was ordered given dyspnea.      Medical Decision Making      Differential Diagnosis/ Diagnostic Considerations: asthma exacerbation, pneumonia     Complicating Factors: The patient already has asthma to contribute to the  complexity of this ED evaluation.    I reviewed prior chart records including office visit note from October 2, 2024.  Chest x-ray unremarkable for infiltrate to suggest pneumonia on my interpretation.       Considered admission however  provided with hour-long duo nebulizers and patient is appearing much improved.  Prednisone dose provided in the emergency department.  She is normoxic on room air and demonstrates no signs of respiratory failure    Prescriptions: Prednisone as below    Dc In stable condition.  Patient is comfortable with the plan.    I spent 30 minutes of critical care time caring for this patient. This does not include time spent on separately reported billable procedures.       Disposition and Plan     Clinical Impression:  1. Moderate asthma with exacerbation, unspecified whether persistent (HCC)        Disposition:  Discharge    Follow-up:  Yoanna Craig DO  [address] [facility] [address] [phone]    Schedule an appointment as soon as possible for a visit in 1 day(s)        Medications Prescribed:  Discharge Medication List as of 11/4/2024 10:26 AM        START taking these medications    Details   predniSONE 20 MG Oral Tab Take 2 tablets (40 mg total) by mouth daily for 4 days., Normal, Disp-8 tablet, R-0                              [1] (Not in a hospital admission)

## 2024-12-11 ENCOUNTER — HOSPITAL ENCOUNTER (OUTPATIENT)
Age: 29
Discharge: HOME | End: 2024-12-11
Payer: COMMERCIAL

## 2024-12-11 VITALS
SYSTOLIC BLOOD PRESSURE: 121 MMHG | DIASTOLIC BLOOD PRESSURE: 77 MMHG | RESPIRATION RATE: 12 BRPM | HEART RATE: 78 BPM | TEMPERATURE: 98 F | OXYGEN SATURATION: 100 %

## 2024-12-11 DIAGNOSIS — R31.9 URINARY TRACT INFECTION WITH HEMATURIA, SITE UNSPECIFIED: Primary | ICD-10-CM

## 2024-12-11 DIAGNOSIS — J02.9 ACUTE PHARYNGITIS, UNSPECIFIED ETIOLOGY: ICD-10-CM

## 2024-12-11 DIAGNOSIS — N39.0 URINARY TRACT INFECTION WITH HEMATURIA, SITE UNSPECIFIED: Primary | ICD-10-CM

## 2024-12-11 LAB
B-HCG UR QL: NEGATIVE
BILIRUB UR QL STRIP: NEGATIVE
CLARITY UR: CLEAR
COLOR UR: YELLOW
GLUCOSE UR STRIP-MCNC: NEGATIVE MG/DL
KETONES UR STRIP-MCNC: NEGATIVE MG/DL
NITRITE UR QL STRIP: NEGATIVE
PH UR STRIP: 7 [PH]
PROT UR STRIP-MCNC: NEGATIVE MG/DL
S PYO AG THROAT QL IA.RAPID: NEGATIVE
SP GR UR STRIP: 1.01
UROBILINOGEN UR STRIP-ACNC: <2 MG/DL

## 2024-12-11 PROCEDURE — 99214 OFFICE O/P EST MOD 30 MIN: CPT

## 2024-12-11 PROCEDURE — 81002 URINALYSIS NONAUTO W/O SCOPE: CPT

## 2024-12-11 PROCEDURE — 81025 URINE PREGNANCY TEST: CPT

## 2024-12-11 PROCEDURE — 87077 CULTURE AEROBIC IDENTIFY: CPT | Performed by: PHYSICIAN ASSISTANT

## 2024-12-11 PROCEDURE — 87086 URINE CULTURE/COLONY COUNT: CPT | Performed by: PHYSICIAN ASSISTANT

## 2024-12-11 PROCEDURE — 87186 SC STD MICRODIL/AGAR DIL: CPT | Performed by: PHYSICIAN ASSISTANT

## 2024-12-11 PROCEDURE — 87651 STREP A DNA AMP PROBE: CPT | Performed by: PHYSICIAN ASSISTANT

## 2024-12-11 RX ORDER — CEPHALEXIN 500 MG/1
500 CAPSULE ORAL 3 TIMES DAILY
Qty: 21 CAPSULE | Refills: 0 | Status: SHIPPED | OUTPATIENT
Start: 2024-12-11 | End: 2024-12-18

## 2024-12-11 NOTE — ED PROVIDER NOTES
Patient Seen in: [facility]    History     Chief Complaint   Patient presents with    Urinary Symptoms     Ammonia smell in urine. Also white patches on right tonsil began around 12 today. - Entered by patient    Sore Throat     Stated Complaint: Urinary Symptoms - Ammonia smell in urine. Also white patches on right tonsil b*    HPI    Ne Calzada is a 29 year old female who presents with chief complaint of malodorous urine.  Onset 4 days ago.  Patient reports associated dysuria.  Patient reports history of urinary tract infection and states her symptoms are similar.  Patient states she noticed right-sided tonsillar exudates today.  Patient states that her daughter was recently diagnosed with strep throat. Patient denies fever, chills, abdominal pain, nausea, vomiting, diarrhea, constipation, melena, hematochezia, flank pain, vaginal bleeding, vaginal discharge, earache, nasal drainage, trismus, drooling, cough, rash, neck pain, neck swelling, restricted neck movement.          Past Medical History:    Anxiety    was on rxn for only one month; has been ok since then    Asthma (HCC)    Gastrointestinal disorder    GERD; presents as wheezing as self reported per pt; she uses an inhaler    Miscarriage (HCC)    Vitamin D deficiency       No past surgical history on file.         Family History   Problem Relation Age of Onset    Hypertension Father     Diabetes Father     Cancer Maternal Grandmother         colon    Cancer Paternal Grandmother         breast cancer in her 80s    Actinic Keratosis Mother     Other (GERD) Mother        Social History     Socioeconomic History    Marital status: Single   Tobacco Use    Smoking status: Former     Passive exposure: Never    Smokeless tobacco: Never   Vaping Use    Vaping status: Former   Substance and Sexual Activity    Alcohol use: No    Drug use: No   Other Topics Concern    Seat Belt Yes     Social Drivers of Health      Received from [facility],  [facility]       Review of Systems    Positive for stated complaint: Urinary Symptoms - Ammonia smell in urine. Also white patches on right tonsil b*  Other systems are as noted in HPI.  Constitutional and vital signs reviewed.      All other systems reviewed and negative except as noted above.    PSFH elements reviewed from today and agreed except as otherwise stated in HPI.    Physical Exam     ED Triage Vitals [12/11/24 1529]   /77   Pulse 78   Resp 12   Temp 97.8 °F (36.6 °C)   Temp src Oral   SpO2 100 %   O2 Device None (Room air)       Current:/77   Pulse 78   Temp 97.8 °F (36.6 °C) (Oral)   Resp 12   LMP 11/17/2024 (Exact Date)   SpO2 100%     PULSE OX within normal limits on room air as interpreted by this provider.        Physical Exam    Constitutional: The patient is cooperative. Appears well-developed and well-nourished.  No acute distress.  Psychological: Alert, No abnormalities of mood, affect.  Head: Normocephalic/atraumatic.  Eyes: Pupils are equal round reactive to light.  Conjunctiva are within normal limits.  ENT: Pharynx injected.  Tonsils 1+ bilaterally.  No tonsillar exudates.  Uvula midline.  No trismus.  No drooling.  TMs within normal limits bilaterally.  Mucous membranes moist.  Neck: The neck is supple.  No meningeal signs.  Chest: There is no tenderness to the chest wall.  No CVA tenderness bilaterally.  Respiratory: Respiratory effort was normal.  There is no stridor.  Air entry is equal.  Cardiovascular: Regular rate and rhythm.  Capillary refill is brisk.    Gastrointestinal: Abdomen soft, nontender, nondistended.  There is no rebound tenderness or guarding.  No organomegaly is noted. No peritoneal signs.  Normal bowel sounds.  Genitourinary: Not examined.  Lymphatic: No gross lymphadenopathy noted.  Musculoskeletal: Musculoskeletal system is grossly intact.  There is no obvious deformity.  Neurological: Gross motor movement is intact in all 4  extremities.  Patient exhibits normal speech.  Skin: Skin is normal to inspection.  Warm and dry.  No obvious bruising.  No obvious rash.          ED Course     Labs Reviewed   [facility] POCT URINALYSIS DIPSTICK - Abnormal; Notable for the following components:       Result Value    Blood, Urine Trace-Intact (*)     Leukocyte esterase urine Trace (*)     All other components within normal limits   POCT PREGNANCY URINE - Normal   RAPID STREP A - Normal   URINE CULTURE, ROUTINE       MDM     Differential diagnosis including but not limited to UTI, ureterolithiasis, vaginitis, urethritis, URI, strep pharyngitis, viral pharyngitis    Urine dip demonstrates trace-intact blood and trace leukocyte esterase.  Urine culture pending.  Will treat empirically with Keflex.    Rapid strep negative.    Physical exam remained stable as previously documented.  Available results reviewed with patient.    I have given the patient instructions regarding their diagnoses, expectations, follow up, and ER precautions. I explained to the patient that emergent conditions may arise and to go to the ER for new, worsening or any persistent conditions. I've explained the importance of following up with their doctor as instructed. The patient verbalized understanding of the discharge instructions and plan.      Disposition and Plan     Clinical Impression:  1. Urinary tract infection with hematuria, site unspecified    2. Acute pharyngitis, unspecified etiology        Disposition:  Discharge    Follow-up:  Yoanna Craig DO  [address] [place] [id number] [phone]    Call in 1 day  For follow-up      Medications Prescribed:  Current Discharge Medication List        START taking these medications    Details   cephALEXin (KEFLEX) 500 MG Oral Cap Take 1 capsule (500 mg total) by mouth 3 (three) times daily for 7 days.  Qty: 21 capsule, Refills: 0

## 2025-01-18 ENCOUNTER — HOSPITAL ENCOUNTER (EMERGENCY)
Facility: HOSPITAL | Age: 30
Discharge: HOME | End: 2025-01-18
Attending: EMERGENCY MEDICINE
Payer: COMMERCIAL

## 2025-01-18 VITALS
OXYGEN SATURATION: 96 % | SYSTOLIC BLOOD PRESSURE: 137 MMHG | BODY MASS INDEX: 22.66 KG/M2 | TEMPERATURE: 98 F | WEIGHT: 120 LBS | HEIGHT: 61 IN | HEART RATE: 106 BPM | RESPIRATION RATE: 20 BRPM | DIASTOLIC BLOOD PRESSURE: 96 MMHG

## 2025-01-18 DIAGNOSIS — R06.02 SHORTNESS OF BREATH: Primary | ICD-10-CM

## 2025-01-18 PROCEDURE — 99283 EMERGENCY DEPT VISIT LOW MDM: CPT
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
## 2025-02-10 ENCOUNTER — HOSPITAL ENCOUNTER (EMERGENCY)
Facility: HOSPITAL | Age: 30
Discharge: HOME | End: 2025-02-10
Payer: COMMERCIAL

## 2025-02-10 VITALS
HEART RATE: 105 BPM | RESPIRATION RATE: 20 BRPM | SYSTOLIC BLOOD PRESSURE: 132 MMHG | OXYGEN SATURATION: 100 % | TEMPERATURE: 98 F | DIASTOLIC BLOOD PRESSURE: 87 MMHG

## 2025-02-10 DIAGNOSIS — J45.41 MODERATE PERSISTENT ASTHMA WITH ACUTE EXACERBATION (HCC): Primary | ICD-10-CM

## 2025-02-10 LAB — B-HCG UR QL: NEGATIVE

## 2025-02-10 PROCEDURE — 99284 EMERGENCY DEPT VISIT MOD MDM: CPT

## 2025-02-10 PROCEDURE — 81025 URINE PREGNANCY TEST: CPT

## 2025-02-10 PROCEDURE — 94640 AIRWAY INHALATION TREATMENT: CPT

## 2025-02-10 RX ORDER — ALBUTEROL SULFATE 5 MG/ML
10 SOLUTION RESPIRATORY (INHALATION) ONCE
Status: COMPLETED | OUTPATIENT
Start: 2025-02-10 | End: 2025-02-10

## 2025-02-10 RX ORDER — ALBUTEROL SULFATE 90 UG/1
2 INHALANT RESPIRATORY (INHALATION) EVERY 4 HOURS PRN
Qty: 1 EACH | Refills: 0 | Status: SHIPPED | OUTPATIENT
Start: 2025-02-10 | End: 2025-03-12

## 2025-02-10 RX ORDER — PREDNISONE 20 MG/1
40 TABLET ORAL DAILY
Qty: 10 TABLET | Refills: 0 | Status: SHIPPED | OUTPATIENT
Start: 2025-02-10 | End: 2025-02-15

## 2025-02-10 RX ORDER — IPRATROPIUM BROMIDE AND ALBUTEROL SULFATE 2.5; .5 MG/3ML; MG/3ML
3 SOLUTION RESPIRATORY (INHALATION) EVERY 6 HOURS PRN
Status: DISCONTINUED | OUTPATIENT
Start: 2025-02-10 | End: 2025-02-10

## 2025-02-10 RX ORDER — PREDNISONE 20 MG/1
60 TABLET ORAL ONCE
Status: COMPLETED | OUTPATIENT
Start: 2025-02-10 | End: 2025-02-10

## 2025-02-10 NOTE — ED INITIAL ASSESSMENT (HPI)
Patient arrives to ER evaluation asthma Exacerbation. Patient states she had difficult since last night and has used her rescue inhaler 10 times with no relief.

## 2025-02-10 NOTE — ED PROVIDER NOTES
Patient Seen in: [facility] Emergency Department      History     Chief Complaint   Patient presents with    Difficulty Breathing     Stated Complaint: Asthma Exacerbation    Subjective:   28yo/f w hx of anxiety, asthma,GERD reports to the ED w c/o asthma exacerbation/wheezing. Started yesterday. Not relieved w inhaler. No fever. No sick contacts. No vomiting. No chest pain. Wheezing w/o productive cough.                 Objective:     Past Medical History:    Anxiety    was on rxn for only one month; has been ok since then    Asthma (HCC)    Gastrointestinal disorder    GERD; presents as wheezing as self reported per pt; she uses an inhaler    Miscarriage (HCC)    Vitamin D deficiency              History reviewed. No pertinent surgical history.             Social History     Socioeconomic History    Marital status: Single   Tobacco Use    Smoking status: Former     Passive exposure: Never    Smokeless tobacco: Never   Vaping Use    Vaping status: Some Days   Substance and Sexual Activity    Alcohol use: Not Currently     Comment: social    Drug use: No   Other Topics Concern    Seat Belt Yes     Social Drivers of Health      Received from [facility]                  Physical Exam     ED Triage Vitals   BP 02/10/25 1244 123/79   Pulse 02/10/25 1244 102   Resp 02/10/25 1346 20   Temp 02/10/25 1244 98.4 °F (36.9 °C)   Temp src 02/10/25 1244 Oral   SpO2 02/10/25 1244 99 %   O2 Device 02/10/25 1244 None (Room air)       Current Vitals:   Vital Signs  BP: 121/87  Pulse: (!) 122  Resp: 20  Temp: 98.4 °F (36.9 °C)  Temp src: Oral    Oxygen Therapy  SpO2: 97 %  O2 Device: None (Room air)        Physical Exam  Vitals and nursing note reviewed.   Constitutional:       General: She is not in acute distress.     Appearance: She is well-developed.   HENT:      Head: Normocephalic and atraumatic.      Nose: Nose normal.      Mouth/Throat:      Mouth: Mucous membranes are moist.   Eyes:       Conjunctiva/sclera: Conjunctivae normal.      Pupils: Pupils are equal, round, and reactive to light.   Cardiovascular:      Rate and Rhythm: Normal rate and regular rhythm.      Heart sounds: Normal heart sounds.   Pulmonary:      Effort: Pulmonary effort is normal.      Breath sounds: Examination of the right-lower field reveals wheezing. Examination of the left-lower field reveals wheezing. Wheezing present.   Abdominal:      General: Bowel sounds are normal.      Palpations: Abdomen is soft.   Musculoskeletal:         General: No tenderness or deformity. Normal range of motion.      Cervical back: Normal range of motion and neck supple.   Skin:     General: Skin is warm and dry.      Capillary Refill: Capillary refill takes less than 2 seconds.      Findings: No rash.      Comments: Normal color   Neurological:      General: No focal deficit present.      Mental Status: She is alert and oriented to person, place, and time.      GCS: GCS eye subscore is 4. GCS verbal subscore is 5. GCS motor subscore is 6.      Cranial Nerves: No cranial nerve deficit.      Gait: Gait normal.             ED Course   Labs Reviewed - No data to display                MDM              Medical Decision Making  30yo/f w hx and exam as stated; cough/wheezing    Afebrile  Hemodynamically stable  No vomiting  Wheezin resolved w nebs  No chest pain  No systemic symptoms    Plan  Dc to home  Close fu      Risk  OTC drugs.  Prescription drug management.        Disposition and Plan     Clinical Impression:  1. Moderate persistent asthma with acute exacerbation (HCC)         Disposition:  Discharge  2/10/2025  2:38 pm    Follow-up:  Yoanna rCaig DO  [address] [facility] [address]  [phone]    Follow up in 2 day(s)            Medications Prescribed:  Current Discharge Medication List        START taking these medications    Details   !! albuterol 108 (90 Base) MCG/ACT Inhalation Aero Soln Inhale 2 puffs into the lungs every 4 (four)  hours as needed.  Qty: 1 each, Refills: 0      predniSONE 20 MG Oral Tab Take 2 tablets (40 mg total) by mouth daily for 5 days.  Qty: 10 tablet, Refills: 0       !! - Potential duplicate medications found. Please discuss with provider.              Supplementary Documentation:

## 2025-03-26 ENCOUNTER — APPOINTMENT (OUTPATIENT)
Dept: GENERAL RADIOLOGY | Facility: HOSPITAL | Age: 30
End: 2025-03-26
Attending: EMERGENCY MEDICINE
Payer: COMMERCIAL

## 2025-03-26 ENCOUNTER — HOSPITAL ENCOUNTER (OUTPATIENT)
Facility: HOSPITAL | Age: 30
Setting detail: OBSERVATION
Discharge: HOME | End: 2025-03-28
Attending: EMERGENCY MEDICINE | Admitting: HOSPITALIST
Payer: COMMERCIAL

## 2025-03-26 ENCOUNTER — APPOINTMENT (OUTPATIENT)
Dept: CT IMAGING | Facility: HOSPITAL | Age: 30
End: 2025-03-26
Attending: EMERGENCY MEDICINE
Payer: COMMERCIAL

## 2025-03-26 ENCOUNTER — HOSPITAL ENCOUNTER (EMERGENCY)
Facility: HOSPITAL | Age: 30
Discharge: HOME | End: 2025-03-26
Attending: EMERGENCY MEDICINE
Payer: COMMERCIAL

## 2025-03-26 VITALS
DIASTOLIC BLOOD PRESSURE: 78 MMHG | HEART RATE: 100 BPM | OXYGEN SATURATION: 100 % | TEMPERATURE: 98 F | RESPIRATION RATE: 24 BRPM | SYSTOLIC BLOOD PRESSURE: 118 MMHG | BODY MASS INDEX: 24 KG/M2 | WEIGHT: 125 LBS

## 2025-03-26 DIAGNOSIS — J45.901 EXACERBATION OF ASTHMA, UNSPECIFIED ASTHMA SEVERITY, UNSPECIFIED WHETHER PERSISTENT (HCC): Primary | ICD-10-CM

## 2025-03-26 LAB
ANION GAP SERPL CALC-SCNC: 10 MMOL/L (ref 0–18)
BUN BLD-MCNC: 16 MG/DL (ref 9–23)
BUN/CREAT SERPL: 16.3 (ref 10–20)
CALCIUM BLD-MCNC: 9.2 MG/DL (ref 8.7–10.4)
CHLORIDE SERPL-SCNC: 105 MMOL/L (ref 98–112)
CO2 SERPL-SCNC: 20 MMOL/L (ref 21–32)
CREAT BLD-MCNC: 0.98 MG/DL
D DIMER PPP FEU-MCNC: 0.51 UG/ML FEU (ref ?–0.5)
EGFRCR SERPLBLD CKD-EPI 2021: 81 ML/MIN/1.73M2 (ref 60–?)
FLUAV + FLUBV RNA SPEC NAA+PROBE: NEGATIVE
FLUAV + FLUBV RNA SPEC NAA+PROBE: NEGATIVE
GLUCOSE BLD-MCNC: 171 MG/DL (ref 70–99)
HCG SERPL QL: NEGATIVE
MAGNESIUM SERPL-MCNC: 2.2 MG/DL (ref 1.6–2.6)
OSMOLALITY SERPL CALC.SUM OF ELEC: 285 MOSM/KG (ref 275–295)
POTASSIUM SERPL-SCNC: 4.7 MMOL/L (ref 3.5–5.1)
RSV RNA SPEC NAA+PROBE: NEGATIVE
SARS-COV-2 RNA RESP QL NAA+PROBE: NOT DETECTED
SODIUM SERPL-SCNC: 135 MMOL/L (ref 136–145)

## 2025-03-26 PROCEDURE — 0241U SARS-COV-2/FLU A AND B/RSV BY PCR (GENEXPERT): CPT | Performed by: EMERGENCY MEDICINE

## 2025-03-26 PROCEDURE — 71045 X-RAY EXAM CHEST 1 VIEW: CPT | Performed by: EMERGENCY MEDICINE

## 2025-03-26 PROCEDURE — 96365 THER/PROPH/DIAG IV INF INIT: CPT

## 2025-03-26 PROCEDURE — 94799 UNLISTED PULMONARY SVC/PX: CPT

## 2025-03-26 PROCEDURE — 85025 COMPLETE CBC W/AUTO DIFF WBC: CPT | Performed by: EMERGENCY MEDICINE

## 2025-03-26 PROCEDURE — 71260 CT THORAX DX C+: CPT | Performed by: EMERGENCY MEDICINE

## 2025-03-26 PROCEDURE — 96375 TX/PRO/DX INJ NEW DRUG ADDON: CPT

## 2025-03-26 PROCEDURE — 80048 BASIC METABOLIC PNL TOTAL CA: CPT | Performed by: EMERGENCY MEDICINE

## 2025-03-26 PROCEDURE — 99285 EMERGENCY DEPT VISIT HI MDM: CPT

## 2025-03-26 PROCEDURE — 84703 CHORIONIC GONADOTROPIN ASSAY: CPT | Performed by: EMERGENCY MEDICINE

## 2025-03-26 PROCEDURE — 94645 CONT INHLJ TX EACH ADDL HOUR: CPT

## 2025-03-26 PROCEDURE — 94644 CONT INHLJ TX 1ST HOUR: CPT

## 2025-03-26 PROCEDURE — 99284 EMERGENCY DEPT VISIT MOD MDM: CPT

## 2025-03-26 PROCEDURE — 94640 AIRWAY INHALATION TREATMENT: CPT

## 2025-03-26 PROCEDURE — 85379 FIBRIN DEGRADATION QUANT: CPT | Performed by: EMERGENCY MEDICINE

## 2025-03-26 PROCEDURE — 83735 ASSAY OF MAGNESIUM: CPT | Performed by: EMERGENCY MEDICINE

## 2025-03-26 RX ORDER — ALBUTEROL SULFATE 5 MG/ML
10 SOLUTION RESPIRATORY (INHALATION) CONTINUOUS
Status: DISCONTINUED | OUTPATIENT
Start: 2025-03-26 | End: 2025-03-26

## 2025-03-26 RX ORDER — MAGNESIUM SULFATE HEPTAHYDRATE 40 MG/ML
2 INJECTION, SOLUTION INTRAVENOUS ONCE
Status: COMPLETED | OUTPATIENT
Start: 2025-03-26 | End: 2025-03-26

## 2025-03-26 RX ORDER — IBUPROFEN 600 MG/1
600 TABLET, FILM COATED ORAL ONCE
Status: COMPLETED | OUTPATIENT
Start: 2025-03-26 | End: 2025-03-26

## 2025-03-26 RX ORDER — ALBUTEROL SULFATE 5 MG/ML
10 SOLUTION RESPIRATORY (INHALATION) ONCE
Status: COMPLETED | OUTPATIENT
Start: 2025-03-26 | End: 2025-03-26

## 2025-03-26 RX ORDER — METHYLPREDNISOLONE SODIUM SUCCINATE 125 MG/2ML
125 INJECTION INTRAMUSCULAR; INTRAVENOUS ONCE
Status: COMPLETED | OUTPATIENT
Start: 2025-03-26 | End: 2025-03-26

## 2025-03-26 RX ORDER — PREDNISONE 20 MG/1
40 TABLET ORAL DAILY
Qty: 10 TABLET | Refills: 0 | Status: ON HOLD | OUTPATIENT
Start: 2025-03-26 | End: 2025-03-27 | Stop reason: CLARIF

## 2025-03-26 NOTE — ED INITIAL ASSESSMENT (HPI)
Patient presents with asthma attack. Wheezing noted in triage. No relief with inhaler  Patient able to speak in complete sentences but labored breathing noted.

## 2025-03-26 NOTE — ED PROVIDER NOTES
Patient Seen in: [facility] Emergency Department      History     Chief Complaint   Patient presents with    Asthma     Stated Complaint: asthma attack    Subjective:   HPI          Objective:     Past Medical History:    Anxiety    was on rxn for only one month; has been ok since then    Asthma (HCC)    Gastrointestinal disorder    GERD; presents as wheezing as self reported per pt; she uses an inhaler    Miscarriage (HCC)    Vitamin D deficiency              History reviewed. No pertinent surgical history.             Social History     Socioeconomic History    Marital status: Single   Tobacco Use    Smoking status: Former     Passive exposure: Never    Smokeless tobacco: Never   Vaping Use    Vaping status: Some Days   Substance and Sexual Activity    Alcohol use: Not Currently     Comment: social    Drug use: No   Other Topics Concern    Seat Belt Yes     Social Drivers of Health      Received from [email or web address]                  Physical Exam     ED Triage Vitals [03/26/25 1247]   /77   Pulse 91   Resp 24   Temp 97.7 °F (36.5 °C)   Temp src Temporal   SpO2 97 %   O2 Device None (Room air)       Current Vitals:   Vital Signs  BP: 118/78  Pulse: 100  Resp: 24  Temp: 97.7 °F (36.5 °C)  Temp src: Temporal    Oxygen Therapy  SpO2: 100 %  O2 Device: None (Room air)        Physical Exam        ED Course   Labs Reviewed - No data to display                MDM      29-year-old female with a history of asthma presents today with shortness of breath.  Patient states that she has had some increase in her asthma symptoms of the last few days which worsened overnight and into today.  She has been using her albuterol inhaler up to every 30 minutes with limited relief.  Denies fevers, leg swelling, or other symptoms.  She relates the symptoms to the change in weather.    On exam, vitals reassuring, nontoxic-appearing, no respiratory distress.  Lung exam with tight lungs with  inspiratory and expiratory wheezing bilaterally.    Differential: Asthma exacerbation    Patient given IV methylprednisolone, IV magnesium, and 10 mg continuous nebs.    On reexamination, still has some wheezing but greatly improved lung sounds and patient feeling much better.  Will DC with burst of prednisone, PMD follow-up instructions, and return precautions.        MDM    Disposition and Plan     Clinical Impression:  1. Exacerbation of asthma, unspecified asthma severity, unspecified whether persistent ([facility])         Disposition:  Discharge  3/26/2025  2:09 pm    Follow-up:  Yoanna Craig DO  [address] [address]  [phone]    Schedule an appointment as soon as possible for a visit in 1 week(s)            Medications Prescribed:  Discharge Medication List as of 3/26/2025  2:33 PM              Supplementary Documentation:

## 2025-03-27 PROBLEM — J45.901 EXACERBATION OF ASTHMA, UNSPECIFIED ASTHMA SEVERITY, UNSPECIFIED WHETHER PERSISTENT (HCC): Status: ACTIVE | Noted: 2025-03-27

## 2025-03-27 LAB
ADENOVIRUS PCR:: NOT DETECTED
B PARAPERT DNA SPEC QL NAA+PROBE: NOT DETECTED
B PERT DNA SPEC QL NAA+PROBE: NOT DETECTED
BASOPHILS # BLD AUTO: 0.01 X10(3) UL (ref 0–0.2)
BASOPHILS NFR BLD AUTO: 0.1 %
C PNEUM DNA SPEC QL NAA+PROBE: NOT DETECTED
CORONAVIRUS 229E PCR:: NOT DETECTED
CORONAVIRUS HKU1 PCR:: NOT DETECTED
CORONAVIRUS NL63 PCR:: NOT DETECTED
CORONAVIRUS OC43 PCR:: NOT DETECTED
DEPRECATED RDW RBC AUTO: 41 FL (ref 35.1–46.3)
EOSINOPHIL # BLD AUTO: 0 X10(3) UL (ref 0–0.7)
EOSINOPHIL NFR BLD AUTO: 0 %
ERYTHROCYTE [DISTWIDTH] IN BLOOD BY AUTOMATED COUNT: 12.5 % (ref 11–15)
FLUAV RNA SPEC QL NAA+PROBE: NOT DETECTED
FLUBV RNA SPEC QL NAA+PROBE: NOT DETECTED
HCT VFR BLD AUTO: 37.9 %
HGB BLD-MCNC: 13.5 G/DL
IMM GRANULOCYTES # BLD AUTO: 0.07 X10(3) UL (ref 0–1)
IMM GRANULOCYTES NFR BLD: 0.5 %
LYMPHOCYTES # BLD AUTO: 0.36 X10(3) UL (ref 1–4)
LYMPHOCYTES NFR BLD AUTO: 2.8 %
MCH RBC QN AUTO: 31.7 PG (ref 26–34)
MCHC RBC AUTO-ENTMCNC: 35.6 G/DL (ref 31–37)
MCV RBC AUTO: 89 FL
METAPNEUMOVIRUS PCR:: NOT DETECTED
MONOCYTES # BLD AUTO: 0.35 X10(3) UL (ref 0.1–1)
MONOCYTES NFR BLD AUTO: 2.7 %
MYCOPLASMA PNEUMONIA PCR:: NOT DETECTED
NEUTROPHILS # BLD AUTO: 12.15 X10 (3) UL (ref 1.5–7.7)
NEUTROPHILS # BLD AUTO: 12.15 X10(3) UL (ref 1.5–7.7)
NEUTROPHILS NFR BLD AUTO: 93.9 %
PARAINFLUENZA 1 PCR:: NOT DETECTED
PARAINFLUENZA 2 PCR:: NOT DETECTED
PARAINFLUENZA 3 PCR:: NOT DETECTED
PARAINFLUENZA 4 PCR:: NOT DETECTED
PLATELET # BLD AUTO: 244 10(3)UL (ref 150–450)
RBC # BLD AUTO: 4.26 X10(6)UL
RHINOVIRUS/ENTERO PCR:: DETECTED
RSV RNA SPEC QL NAA+PROBE: NOT DETECTED
SARS-COV-2 RNA NPH QL NAA+NON-PROBE: NOT DETECTED
WBC # BLD AUTO: 12.9 X10(3) UL (ref 4–11)

## 2025-03-27 PROCEDURE — 94799 UNLISTED PULMONARY SVC/PX: CPT

## 2025-03-27 PROCEDURE — 96360 HYDRATION IV INFUSION INIT: CPT

## 2025-03-27 PROCEDURE — 94760 N-INVAS EAR/PLS OXIMETRY 1: CPT

## 2025-03-27 PROCEDURE — 94640 AIRWAY INHALATION TREATMENT: CPT

## 2025-03-27 PROCEDURE — 85025 COMPLETE CBC W/AUTO DIFF WBC: CPT | Performed by: EMERGENCY MEDICINE

## 2025-03-27 PROCEDURE — 0202U NFCT DS 22 TRGT SARS-COV-2: CPT | Performed by: HOSPITALIST

## 2025-03-27 PROCEDURE — 94664 DEMO&/EVAL PT USE INHALER: CPT

## 2025-03-27 RX ORDER — ACETAMINOPHEN 500 MG
500 TABLET ORAL EVERY 4 HOURS PRN
Status: DISCONTINUED | OUTPATIENT
Start: 2025-03-27 | End: 2025-03-28

## 2025-03-27 RX ORDER — METHYLPREDNISOLONE SODIUM SUCCINATE 125 MG/2ML
125 INJECTION INTRAMUSCULAR; INTRAVENOUS EVERY 8 HOURS
Status: DISCONTINUED | OUTPATIENT
Start: 2025-03-27 | End: 2025-03-27

## 2025-03-27 RX ORDER — PREDNISONE 20 MG/1
40 TABLET ORAL
Status: DISCONTINUED | OUTPATIENT
Start: 2025-03-27 | End: 2025-03-28

## 2025-03-27 RX ORDER — ONDANSETRON 2 MG/ML
4 INJECTION INTRAMUSCULAR; INTRAVENOUS EVERY 6 HOURS PRN
Status: DISCONTINUED | OUTPATIENT
Start: 2025-03-27 | End: 2025-03-28

## 2025-03-27 RX ORDER — POLYETHYLENE GLYCOL 3350 17 G/17G
17 POWDER, FOR SOLUTION ORAL DAILY PRN
Status: DISCONTINUED | OUTPATIENT
Start: 2025-03-27 | End: 2025-03-28

## 2025-03-27 RX ORDER — SENNOSIDES 8.6 MG
17.2 TABLET ORAL NIGHTLY PRN
Status: DISCONTINUED | OUTPATIENT
Start: 2025-03-27 | End: 2025-03-28

## 2025-03-27 RX ORDER — SODIUM PHOSPHATE, DIBASIC AND SODIUM PHOSPHATE, MONOBASIC 7; 19 G/230ML; G/230ML
1 ENEMA RECTAL ONCE AS NEEDED
Status: DISCONTINUED | OUTPATIENT
Start: 2025-03-27 | End: 2025-03-28

## 2025-03-27 RX ORDER — PROCHLORPERAZINE EDISYLATE 5 MG/ML
5 INJECTION INTRAMUSCULAR; INTRAVENOUS EVERY 8 HOURS PRN
Status: DISCONTINUED | OUTPATIENT
Start: 2025-03-27 | End: 2025-03-28

## 2025-03-27 RX ORDER — BISACODYL 10 MG
10 SUPPOSITORY, RECTAL RECTAL
Status: DISCONTINUED | OUTPATIENT
Start: 2025-03-27 | End: 2025-03-28

## 2025-03-27 RX ORDER — METHYLPREDNISOLONE SODIUM SUCCINATE 125 MG/2ML
60 INJECTION INTRAMUSCULAR; INTRAVENOUS EVERY 8 HOURS
Status: DISCONTINUED | OUTPATIENT
Start: 2025-03-27 | End: 2025-03-27

## 2025-03-27 RX ORDER — ALBUTEROL SULFATE 0.83 MG/ML
2.5 SOLUTION RESPIRATORY (INHALATION)
Status: DISCONTINUED | OUTPATIENT
Start: 2025-03-27 | End: 2025-03-27

## 2025-03-27 RX ORDER — ENOXAPARIN SODIUM 100 MG/ML
40 INJECTION SUBCUTANEOUS DAILY
Status: DISCONTINUED | OUTPATIENT
Start: 2025-03-27 | End: 2025-03-28

## 2025-03-27 RX ORDER — FLUTICASONE PROPIONATE AND SALMETEROL 500; 50 UG/1; UG/1
1 POWDER RESPIRATORY (INHALATION) 2 TIMES DAILY
Status: DISCONTINUED | OUTPATIENT
Start: 2025-03-27 | End: 2025-03-28

## 2025-03-27 RX ORDER — ALBUTEROL SULFATE 0.83 MG/ML
2.5 SOLUTION RESPIRATORY (INHALATION)
Status: DISCONTINUED | OUTPATIENT
Start: 2025-03-28 | End: 2025-03-28

## 2025-03-27 NOTE — PLAN OF CARE
Problem: Patient Centered Care  Goal: Patient preferences are identified and integrated in the patient's plan of care  Description: Interventions:- What would you like us to know as we care for you? Levi with parents- Provide timely, complete, and accurate information to patient/family- Incorporate patient and family knowledge, values, beliefs, and cultural backgrounds into the planning and delivery of care- Encourage patient/family to participate in care and decision-making at the level they choose- Honor patient and family perspectives and choices  Outcome: Progressing     Problem: Patient/Family Goals  Goal: Patient/Family Long Term Goal  Description: Patient's Long Term Goal: Interventions:- - See additional Care Plan goals for specific interventions  Outcome: Progressin  Goal: Patient/Family Short Term Goal  Description: Patient's Short Term Goal: Interventions: - - See additional Care Plan goals for specific interventions  Outcome: Progressing

## 2025-03-27 NOTE — H&P
[facility] Hospitalist H&P       CC: Dyspnea       PCP: Yoanna Craig DO    History of Present Illness: Ms. Calzada is a 29 year old female with PMH sig for GERD, Asthma who presents with asthma exacerbation. Patient states she developed coughing and wheezing a few days ago, that became progressively worse, was seen in ER early yesterday and was given nebs and steroids with initial improvement, and was sent home but symptoms got worse last night with sig wheezing so she came back to the ER.  She had some back pain from coughing, but no anterior chest pain, no fevers or chills, no nausea or vomiting, no HA or vision changes, no other complaints.       PMH  Past Medical History:    Anxiety    was on rxn for only one month; has been ok since then    Asthma ([facility])    Gastrointestinal disorder    GERD; presents as wheezing as self reported per pt; she uses an inhaler    Miscarriage (HCC)    Vitamin D deficiency        PSH  History reviewed. No pertinent surgical history.     ALL:  Allergies[1]     Home Medications:  Medications Taking[2]      Soc Hx  Social History     Tobacco Use    Smoking status: Former     Passive exposure: Never    Smokeless tobacco: Never   Substance Use Topics    Alcohol use: Not Currently     Comment: social        Fam Hx  Family History   Problem Relation Age of Onset    Hypertension Father     Diabetes Father     Cancer Maternal Grandmother         colon    Cancer Paternal Grandmother         breast cancer in her 80s    Actinic Keratosis Mother     Other (GERD) Mother        Review of Systems  Comprehensive ROS reviewed and negative except for what's stated above.     OBJECTIVE:  /74 (BP Location: Right arm)   Pulse 96   Temp 99 °F (37.2 °C) (Oral)   Resp 16   Ht 5' 2\" (1.575 m)   Wt 132 lb 8 oz (60.1 kg)   LMP 02/19/2025 (Exact Date)   SpO2 94%   BMI 24.23 kg/m²   General:  Alert, no distress   Head:  Normocephalic, without obvious abnormality, atraumatic.   Eyes:  Sclera  anicteric, No conjunctival pallor, EOMs intact.    Nose: Nares normal. Septum midline. Mucosa normal. No drainage.   Throat: Lips, mucosa, and tongue normal. Teeth and gums normal.   Neck: Supple,    Lungs:   Wheezing noted bilaterally    Chest wall:  No tenderness or deformity.   Heart:  Regular rate and rhythm, S1, S2 normal, no murmur, rub or gallop appreciated   Abdomen:   Soft, non-tender. Bowel sounds normal. No masses,  No organomegaly. Non distended   Extremities: Extremities normal, atraumatic, no cyanosis or edema.   Skin: Skin color, texture, turgor normal. No rashes or lesions.    Neurologic: Normal strength, no focal deficit appreciated     Diagnostic Data:    CBC/Chem  Recent Labs   Lab 03/27/25  0034   WBC 12.9*   HGB 13.5   MCV 89.0   .0       Recent Labs   Lab 03/26/25  2240   *   K 4.7      CO2 20.0*   BUN 16   CREATSERUM 0.98   *   CA 9.2   MG 2.2       No results for input(s): \"ALT\", \"AST\", \"ALB\", \"AMYLASE\", \"LIPASE\", \"LDH\" in the last 168 hours.    Invalid input(s): \"ALPHOS\", \"TBIL\", \"DBIL\", \"TPROT\"    No results for input(s): \"TROP\" in the last 168 hours.       Radiology: XR CHEST AP PORTABLE  (CPT=71045)    Result Date: 3/27/2025  CONCLUSION:  Negative for radiographically evident acute intrathoracic process.    A preliminary report was issued by the [facility] teleradiology service. There are no major discrepancies.   Dictated by (CST): Meng Rangle MD on 3/27/2025 at 6:31 AM     Finalized by (CST): Meng Rangel MD on 3/27/2025 at 6:31 AM             ASSESSMENT / PLAN:   Ms. Calzada is a 29 year old female with PMH sig for GERD, Asthma who presents with asthma exacerbation.    Asthma exacerbation   - CXR clear  - CT chest pre-anne neg for PE, but noted to have bronchitis  - RVP pending  - was on advair but no longer taking, has had freq asthma exacerbations the past few months  - candice nebs, IV steroids  - Pulm eval  - likely will benefit from maintenance  inhaler     FN:  - IVF:none  - Diet: adv     DVT Prophy:scd, lovenox  Lines: PIV    Dispo: pending clinical course    Outpatient records or previous hospital records reviewed.     Further recommendations pending patient's clinical course.  [facility] hospitalist to continue to follow patient while in house    Patient and/or patient's family given opportunity to ask questions and note understanding and agreeing with therapeutic plan as outlined    Thank You,  Taj Simental MD    Hospitalist with [facility] [place] Emergency Department  Answering Service number: [phone]         [1]   Allergies  Allergen Reactions    Other SHORTNESS OF BREATH     Dog dander    Alprazolam OTHER (SEE COMMENTS)     Panic attacks and throat swelling       Seasonal Runny nose   [2]   Outpatient Medications Marked as Taking for the 3/26/25 encounter (Hospital Encounter)   Medication Sig Dispense Refill    multivitamin Oral Chew Tab Chew 1 tablet by mouth daily.      [] albuterol 108 (90 Base) MCG/ACT Inhalation Aero Soln Inhale 2 puffs into the lungs every 4 (four) hours as needed. 1 each 0

## 2025-03-27 NOTE — PLAN OF CARE
Problem: Patient Centered Care  Goal: Patient preferences are identified and integrated in the patient's plan of care  Description: Interventions:- What would you like us to know as we care for you? From home with parents - Provide timely, complete, and accurate information to patient/family- Incorporate patient and family knowledge, values, beliefs, and cultural backgrounds into the planning and delivery of care- Encourage patient/family to participate in care and decision-making at the level they choose- Honor patient and family perspectives and choices  Outcome: Progressing     Problem: Patient/Family Goals  Goal: Patient/Family Long Term Goal  Description: Patient's Long Term Goal: Interventions:- - See additional Care Plan goals for specific interventions  Outcome: Progressing  Goal: Patient/Family Short Term Goal  Description: Patient's Short Term Goal: Interventions: - - See additional Care Plan goals for specific interventions  Outcome: Progressing     Problem: RESPIRATORY - ADULT  Goal: Achieves optimal ventilation and oxygenation  Description: INTERVENTIONS:- Assess for changes in respiratory status- Assess for changes in mentation and behavior- Position to facilitate oxygenation and minimize respiratory effort- Oxygen supplementation based on oxygen saturation or ABGs- Provide Smoking Cessation handout, if applicable- Encourage broncho-pulmonary hygiene including cough, deep breathe, Incentive Spirometry- Assess the need for suctioning and perform as needed- Assess and instruct to report SOB or any respiratory difficulty- Respiratory Therapy support as indicated- Manage/alleviate anxiety- Monitor for signs/symptoms of CO2 retention  Outcome: Progressing

## 2025-03-27 NOTE — ED INITIAL ASSESSMENT (HPI)
Pt to the emergency room for ongoing wheezing. Pt states that she was just discharged from here with the same complaint, she was given magnesium, solumedrol, and albuterol which helped, however by 6pm pt states that the symptoms began to worsen again. Pt states she administered albuterol inhaled x4 times since discharge, last dose 1 hour pta. Inspiratory and expiratory wheezing noted upon auscultation

## 2025-03-27 NOTE — CONSULTS
Pulmonary H&P/Consult     NAME: Ne Calzada - ROOM: [facility]/527-A - MRN: [id number] - Age: 29 year old - :  1995    Date of Admission: 3/26/2025 10:19 PM  Admission Diagnosis: Exacerbation of asthma, unspecified asthma severity, unspecified whether persistent (HCC) [J45.901]    Assessment/Plan:  Asthma exacerbation  - on RA, wheezing resolved  - IV steroids, taper today  - bd nebs  - RVP pending  - will add advair to her regimen  Dispo - full code  - will follow    Thank you for allowing me to participate in the care of this patient, please call with any questions.    Andressa Leigh M.D.  Pulmonary and Critical Care Medicine  [facility]     HPI:  Ne Calzada is a 29 year old former-smoker with asthma, GERD and anxiety who is admitted with wheezing and cough. At baseline is on albuterol rescue therapy alone, though previously was on a controller inhaler. Symptoms started 4 days ago with cough and wheezing and associated dyspnea. Was seen in the ED and treated with IV steroids, albuterol and magnesium with some improvement but returned again last night with recurrence of wheeze and cough along with cest tightness. No fevers, chills, night sweats. Denies n/v, diarrhea. One sick contat in daughter. No recent travel. No prior exacerbations in the last year. CTA PE protocol in ED negative for PE.     Past Medical History:    Anxiety    was on rxn for only one month; has been ok since then    Asthma (HCC)    Gastrointestinal disorder    GERD; presents as wheezing as self reported per pt; she uses an inhaler    Miscarriage (HCC)    Vitamin D deficiency   History reviewed. No pertinent surgical history.  Family History   Problem Relation Age of Onset    Hypertension Father     Diabetes Father     Cancer Maternal Grandmother         colon    Cancer Paternal Grandmother         breast cancer in her 80s    Actinic Keratosis Mother     Other (GERD) Mother      Social History     Tobacco Use     Smoking status: Former     Passive exposure: Never    Smokeless tobacco: Never   Substance Use Topics    Alcohol use: Not Currently     Comment: social     Medications:   enoxaparin  40 mg Subcutaneous Daily    albuterol  2.5 mg Nebulization 6 times per day    methylPREDNISolone  60 mg Intravenous Q8H       ALLERGIES: Allergies[1]    REVIEW OF SYSTEMS: 10 systems reviewed, negative except as stated in the HPI    OBJECTIVE:  Intake/Output Summary (Last 24 hours) at 3/27/2025 0830  Last data filed at 3/27/2025 0221  Gross per 24 hour   Intake 1010 ml   Output --   Net 1010 ml     /74 (BP Location: Right arm)   Pulse 96   Temp 99 °F (37.2 °C) (Oral)   Resp 16   Ht 5' 2\" (1.575 m)   Wt 132 lb 8 oz (60.1 kg)   LMP 02/19/2025 (Exact Date)   SpO2 94%   BMI 24.23 kg/m²   Physical Exam:   General: alert, cooperative, oriented.  No respiratory distress.   Lungs: Clear to auscultation bilaterally, no focal wheezes or crackles    Chest wall: No tenderness or deformity.   Heart: Regular rate and rhythm   Abdomen: soft, non-tender, non-distended, positive BS.   Extremity: no clubbing    LABS/STUDIES: Reviewed in [facility]  Recent Labs   Lab 03/27/25  0034   RBC 4.26   HGB 13.5   HCT 37.9   MCV 89.0   MCH 31.7   MCHC 35.6   RDW 12.5   NEPRELIM 12.15*   WBC 12.9*   .0     Recent Labs   Lab 03/26/25  2240   *   BUN 16   CREATSERUM 0.98   CA 9.2   *   K 4.7      CO2 20.0*     No results for input(s): \"BNP\" in the last 168 hours.  No results for input(s): \"TROP\", \"CK\" in the last 168 hours.  Imaging: I independently visualized all relevant chest imaging in PACS, agree with radiology interpretation except where noted.          [1]   Allergies  Allergen Reactions    Other SHORTNESS OF BREATH     Dog dander    Alprazolam OTHER (SEE COMMENTS)     Panic attacks and throat swelling       Seasonal Runny nose

## 2025-03-27 NOTE — ED QUICK NOTES
Orders for admission, patient is aware of plan and ready to go upstairs. Any questions, please call ED RN Lidia at extension [phone].     Patient Covid vaccination status: Fully vaccinated     COVID Test Ordered in ED: SARS-CoV-2/Flu A and B/RSV by PCR (GeneXpert)    COVID Suspicion at Admission: N/A    Running Infusions:      Mental Status/LOC at time of transport: a/ox4    Other pertinent information:   CIWA score: N/A   NIH score:  N/A

## 2025-03-27 NOTE — ED PROVIDER NOTES
Patient Seen in: [facility] Emergency Department    History   No chief complaint on file.    Stated Complaint: Wheezing Bounceback    HPI    HPI: Ne Calzada is a 29 year old female with a history of asthma who presents with wheezing and a cough. Patient has a history of asthma and normally treated with albuterol.  Was previously on Advair but was taken off this by her primary doctor.  States she has been coughing and wheezing for the past 3-4 days.  She was seen here in this emergency department earlier in the day where she got albuterol, magnesium, IV Solu-Medrol and felt better however states about 3 hours later started to cough and feel herself wheeze again. Reports her breathing feels tight in her chest and she feels quite short of breath now. She took her albuterol inhaler multiple times and she was discharged, most recently about an hour ago and then had 1 pause in triage as well without relief.  She denies fever.  Reports cough with occasionally blood-tinged sputum.  She denies abdominal pain, vomiting, diarrhea, leg swelling or calf pain.  She is not on OCPs.  No history of DVT or PE though she is concerned she could have a blood clot.  No recent travel or hospitalizations. Daughter was sick last week with URI symptoms.     Past Medical History:    Anxiety    was on rxn for only one month; has been ok since then    Asthma (HCC)    Gastrointestinal disorder    GERD; presents as wheezing as self reported per pt; she uses an inhaler    Miscarriage (HCC)    Vitamin D deficiency       History reviewed. No pertinent surgical history.         Family History   Problem Relation Age of Onset    Hypertension Father     Diabetes Father     Cancer Maternal Grandmother         colon    Cancer Paternal Grandmother         breast cancer in her 80s    Actinic Keratosis Mother     Other (GERD) Mother        Social History     Socioeconomic History    Marital status: Single   Tobacco Use    Smoking status:  Former     Passive exposure: Never    Smokeless tobacco: Never   Vaping Use    Vaping status: Former   Substance and Sexual Activity    Alcohol use: Not Currently     Comment: social    Drug use: No   Other Topics Concern    Seat Belt Yes     Social Drivers of Health      [facility]       Review of Systems    Positive for stated complaint: Wheezing Bounceback  Other systems are as noted in HPI.  Constitutional and vital signs reviewed.      All other systems reviewed and negative except as noted above.    PSFH elements reviewed from today and agreed except as otherwise stated in HPI.    Physical Exam     ED Triage Vitals [03/26/25 2202]   /82   Pulse 110   Resp 18   Temp 98.3 °F (36.8 °C)   Temp src Temporal   SpO2 96 %   O2 Device None (Room air)       Current:/83 (BP Location: Right arm)   Pulse 95   Temp 98.3 °F (36.8 °C) (Oral)   Resp 16   Ht 157.5 cm (5' 2\")   Wt 60.1 kg   LMP 02/19/2025 (Exact Date)   SpO2 98%   BMI 24.23 kg/m²   PULSE OX 96% on RA         Physical Exam  Constitutional:  no acute distress, speaking in full sentences    Eyes: conjunctiva normal.  HENT: Atraumatic, oropharynx moist. Neck supple.  Respiratory: diminished at lung bases with expiratory wheezes, +exp wheezes in L upper lung fields. No rhonchi or rales. Mild tachypnea, speaking in full sentences.   Cardiovascular: tachycardic, no murmur, 2+ radial and dp pulses bilaterally    Abdomen: Soft, no tenderness, not distended  Back: Atraumatic  Musculoskeletal: No edema, atraumatic  Skin: Warm, dry, no rash.  Neurologic: Alert & oriented x 3, no focal deficits  Psych: Calm, cooperative, nl affect        ED Course     Labs Reviewed   BASIC METABOLIC PANEL (8) - Abnormal; Notable for the following components:       Result Value    Glucose 171 (*)     Sodium 135 (*)     CO2 20.0 (*)     All other components within normal limits   CBC WITH DIFFERENTIAL WITH PLATELET - Abnormal;  Notable for the following components:    WBC 12.9 (*)     Neutrophil Absolute Prelim 12.15 (*)     Neutrophil Absolute 12.15 (*)     Lymphocyte Absolute 0.36 (*)     All other components within normal limits   D-DIMER - Abnormal; Notable for the following components:    D-Dimer 0.51 (*)     All other components within normal limits   MAGNESIUM - Normal   HCG, BETA SUBUNIT, QUAL - Normal   SARS-COV-2/FLU A AND B/RSV BY PCR (GENEXPERT) - Normal    Narrative:     This test is intended for the qualitative detection and differentiation of SARS-CoV-2, influenza A, influenza B, and respiratory syncytial virus (RSV) viral RNA in nasopharyngeal or nares swabs from individuals suspected of respiratory viral infection consistent with COVID-19 by their healthcare provider. Signs and symptoms of respiratory viral infection due to SARS-CoV-2, influenza, and RSV can be similar.    Test performed using the Xpert Xpress SARS-CoV-2/FLU/RSV (real time RT-PCR)  assay on the GeneXpert instrument, [employer], [place] [address].   This test is being used under the Food and Drug Administration's Emergency Use Authorization.    The authorized Fact Sheet for Healthcare Providers for this assay is available upon request from the laboratory.       MDM     This patient presents with wheezing, shortness of breath and cough, reports occasional hemoptysis small volume    Tachycardic in triage, other VSS. Wheezing on exam     Reviewed visit from earlier today, pt received albuterol hour long, solumedrol and magnesium already     Differential diagnoses considered includes, but is not limited to:   Asthma exacerbation   Bronchitis  Pneumonia  Viral syndrome  Less likely PE, considered as cannot exclude by perc rule given tachycardia and hemoptysis though suspect more likely 2/2 bronchitis     Will obtain the following tests: cbc, bmp, viral swab, cxr, mg, dimer, hcg   Will administer the following medications/therapies: albuterol 10mg nebulizer,  motrin for pain     Please see ED course for my independent review of these tests/imaging results.    Chronic conditions affecting care: asthma     Workup and medications considered but not ordered: n/a     Social Determinants of Health that impacted care: n/a     ED Course as of 03/27/25 0336  ------------------------------------------------------------  Time: 03/26 2310  Comment: Mag wnl. Hcg neg   ------------------------------------------------------------  Time: 03/27 0055  Comment: Patient reassessed, has an occasional expiratory wheeze the lungs otherwise clear, no tachypnea.  Updated with results so far.  She is tachycardic I suspect likely from repeated albuterol.  Will reassess after scan results, if still wheezing we will plan for observation  ------------------------------------------------------------  Time: 03/27 0121  Comment: CT ANGIOGRAM CHEST WITH IV CONTRAST    Comparison: Chest x-ray from 3/26/2025      IMPRESSION:    Moderate limitation in the evaluation of the pulmonary arteries due to respiratory motion artifacts as well as streak artifacts from the contrast bolus.  Allowing for this, no definite evidence for pulmonary embolism.    The thoracic aorta is not well evaluated due to significant pulsation artifacts.  The ascending aorta does appear to be enlarged up to 3.8 cm AP, prominent for patient's age.  Recommend outpatient workup/management.    No pericardial effusion.    There is mild airway thickening and a few airway opacities, can be seen with reactive airways disease/bronchitis.  Mild mosaic attenuation may reflect associated air trapping.  No evidence for pneumonia or pulmonary edema.  No evidence for alveolar hemorrhage.  No pleural effusion or pneumothorax.    Prominent distention of the visualized stomach.    ------------------------------------------------------------  Time: 03/27 0147  Comment: Pt reevaluated. Wheezing again. After discussion she is agreeable for observation. D/w  hospitalist Dr. Norton she accepts admission for observation. Consult placed to pulm to see in AM.            Disposition and Plan     Clinical Impression:  1. Exacerbation of asthma, unspecified asthma severity, unspecified whether persistent (HCC)         Disposition:  Admit  3/27/2025  1:35 am        Supplementary Documentation:         Hospital Problems       Present on Admission  Date Reviewed: 7/25/2024            ICD-10-CM Noted POA    * (Principal) Exacerbation of asthma, unspecified asthma severity, unspecified whether persistent ([facility]) J45.901 3/27/2025 Unknown                                         Rajani Beebe DO  Attending Physician  Emergency Medicine

## 2025-03-28 VITALS
TEMPERATURE: 99 F | OXYGEN SATURATION: 96 % | HEART RATE: 84 BPM | HEIGHT: 62 IN | SYSTOLIC BLOOD PRESSURE: 127 MMHG | BODY MASS INDEX: 24.38 KG/M2 | DIASTOLIC BLOOD PRESSURE: 88 MMHG | WEIGHT: 132.5 LBS | RESPIRATION RATE: 16 BRPM

## 2025-03-28 PROCEDURE — 94640 AIRWAY INHALATION TREATMENT: CPT

## 2025-03-28 PROCEDURE — 94799 UNLISTED PULMONARY SVC/PX: CPT

## 2025-03-28 RX ORDER — PREDNISONE 10 MG/1
TABLET ORAL
Qty: 30 TABLET | Refills: 0 | Status: SHIPPED | OUTPATIENT
Start: 2025-03-28

## 2025-03-28 RX ORDER — FLUTICASONE PROPIONATE AND SALMETEROL 500; 50 UG/1; UG/1
1 POWDER RESPIRATORY (INHALATION) 2 TIMES DAILY
Qty: 60 EACH | Refills: 0 | Status: SHIPPED | OUTPATIENT
Start: 2025-03-28

## 2025-03-28 RX ORDER — ALBUTEROL SULFATE 0.83 MG/ML
2.5 SOLUTION RESPIRATORY (INHALATION) 2 TIMES DAILY
Status: DISCONTINUED | OUTPATIENT
Start: 2025-03-28 | End: 2025-03-28

## 2025-03-28 RX ORDER — ALBUTEROL SULFATE 90 UG/1
2 INHALANT RESPIRATORY (INHALATION) EVERY 6 HOURS PRN
Qty: 1 EACH | Refills: 0 | Status: SHIPPED | OUTPATIENT
Start: 2025-03-28

## 2025-03-28 NOTE — DISCHARGE SUMMARY
General Medicine Discharge Summary     Patient ID:  Ne Calzada  29 year old  6/9/1995    Admit date: 3/26/2025    Discharge date and time: 3/28/2025    Attending Physician: Taj Simental MD     Consults: IP CONSULT TO PULMONOLOGY    Primary Care Physician: Yoanna Craig DO     Reason for admission: asthma exacerbation    Risk For Readmission: low    Discharge Diagnoses: Exacerbation of asthma, unspecified asthma severity, unspecified whether persistent (HCC) [J45.901]  See Additional Discharge Diagnoses in Hospital Course    Discharged Condition: good    Follow-up with labs/images appointments: FU with pulm in 2 weeks and PCP    Exam  Gen: No acute distress  Pulm: Lungs clear, normal respiratory effort  CV: Heart with regular rate and rhythm  Abd: Abdomen soft,   EXT: no edema     HPI:   History of Present Illness: Ms. Calzada is a 29 year old female with PMH sig for GERD, Asthma who presents with asthma exacerbation. Patient states she developed coughing and wheezing a few days ago, that became progressively worse, was seen in ER early yesterday and was given nebs and steroids with initial improvement, and was sent home but symptoms got worse last night with sig wheezing so she came back to the ER.  She had some back pain from coughing, but no anterior chest pain, no fevers or chills, no nausea or vomiting, no HA or vision changes, no other complaints.     Hospital Course:   Ms. Calzada is a 29 year old female with PMH sig for GERD, Asthma who presents with asthma exacerbation, RVP + rhino virus, CTA neg for PE, seen by pulm, started on advair and pred, scripts sent, feeling better, wheezing resolved, cleared by pulm for DC home, fu with PCP and pulm on DC, patient agreeable for DC home.       Operative Procedures:      Imaging: CT CHEST PE AORTA (IV ONLY) (CPT=71260)    Result Date: 3/27/2025  CONCLUSION:  Somewhat motion-degraded study. Within these parameters: 1. No evidence of acute pulmonary embolism to  the level of the first order subsegmental pulmonary artery branches.  2. Areas of bronchial thickening and mucous plugging are seen, perhaps indicative underlying bronchitis.  3. Lesser incidental findings as above.    A preliminary report was issued by the [facility] teleradiology service. There are no major discrepancies.   Dictated by (CST): Meng Rangel MD on 3/27/2025 at 9:59 AM     Finalized by (CST): Meng Rangel MD on 3/27/2025 at 10:05 AM          XR CHEST AP PORTABLE  (CPT=71045)    Result Date: 3/27/2025  CONCLUSION:  Negative for radiographically evident acute intrathoracic process.    A preliminary report was issued by the [facility] teleradiology service. There are no major discrepancies.   Dictated by (CST): Meng Rangel MD on 3/27/2025 at 6:31 AM     Finalized by (CST): Meng Rangel MD on 3/27/2025 at 6:31 AM             Disposition: home    Activity: activity as tolerated  Diet: regular diet  Wound Care: as directed  Code Status: Full Code      Home Medication Changes:     Med list     Medication List        START taking these medications      fluticasone-salmeterol 500-50 MCG/ACT Aepb  Commonly known as: Advair Diskus  Inhale 1 puff into the lungs 2 (two) times daily.            CHANGE how you take these medications      albuterol 108 (90 Base) MCG/ACT Aers  Commonly known as: Ventolin HFA  Inhale 2 puffs into the lungs every 6 (six) hours as needed for Wheezing.  What changed:   when to take this  reasons to take this     predniSONE 10 MG Tabs  Commonly known as: Deltasone  Take 40mg x 3d, then 30mg x 3d, then 20mg x 3d, then 10mg x 3d  What changed:   medication strength  how much to take  how to take this  when to take this  additional instructions            CONTINUE taking these medications      multivitamin Chew               Where to Get Your Medications        These medications were sent to [id number], [place] - [address] AT [facility]  [address], [phone], [phone]  [address] [place] [address]      Hours: 24-hours Phone: [phone]   albuterol 108 (90 Base) MCG/ACT Aers  fluticasone-salmeterol 500-50 MCG/ACT Aepb  predniSONE 10 MG Tabs         FU   Follow-up Information       Yoanna Craig DO. Schedule an appointment as soon as possible for a visit in 1 week(s).    Specialty: Family Practice  Contact information:  [address]  [facility] [id number] [phone]               Altaf Daigle MD Follow up in 2 week(s).    Specialty: PULMONARY DISEASES  Contact information:  [address]   [address]  [facility] [address] [phone]                             DC instructions:      Other Discharge Instructions:         Follow up with pulm in 2 weeks.          I reconciled current and discharge medications on day of discharge, discussed changes with patient and noted changes above.       Total Time Coordinating Care: Greater than 30 minutes    Patient had opportunity to ask questions and state understand and agree with therapeutic plan as outlined    Thank You,    Taj Simental MD   Hospitalist with [facility]

## 2025-03-28 NOTE — PROGRESS NOTES
Pulmonary Progress Note     Assessment / Plan:  Asthma exacerbation - rhinovirus  - on RA, wheezing resolved  - pred taper  - bd nebs  - added advair to her regimen, continue as outpt  Dispo - full code  - ok for discharge from a pulmonary perspective on steroid taper, advair(or formulary sub), and albuterol. F/u with PCP or pulmonary in 2 weeks      Subjective:  Feels much better. Ready to go home    Objective:  Vitals:    03/27/25 1602 03/27/25 1955 03/27/25 2104 03/28/25 0452   BP: 117/78  115/79 127/88   BP Location: Right arm  Right arm Right arm   Pulse: 93 90 96 84   Resp: 18 18 18 16   Temp: 98.5 °F (36.9 °C)  98.4 °F (36.9 °C) 99 °F (37.2 °C)   TempSrc: Oral  Oral Oral   SpO2: 96% 99% 96% 96%   Weight:       Height:         Physical Exam:  General - alert in nad  Respiratory - Clear to auscultation bilaterally, no focal wheezes or crackles   Cardiovascular - regular rate and rhythm  Abdo - soft, NTND  Ext - no clubbing  Mental status - interactive and answering questions appropriately    Medications:  Reviewed in EMR    Lab Data:  Reviewed in EMR    Imaging:  I independently visualized all relevant chest imaging in PACS and agree with radiology interpretation except where noted.

## 2025-04-21 ENCOUNTER — HOSPITAL ENCOUNTER (OUTPATIENT)
Age: 30
Discharge: HOME | End: 2025-04-21
Attending: STUDENT IN AN ORGANIZED HEALTH CARE EDUCATION/TRAINING PROGRAM
Payer: COMMERCIAL

## 2025-04-21 VITALS
OXYGEN SATURATION: 100 % | RESPIRATION RATE: 18 BRPM | DIASTOLIC BLOOD PRESSURE: 80 MMHG | SYSTOLIC BLOOD PRESSURE: 115 MMHG | HEART RATE: 81 BPM | TEMPERATURE: 98 F

## 2025-04-21 DIAGNOSIS — J03.90 TONSILLITIS: Primary | ICD-10-CM

## 2025-04-21 LAB — S PYO AG THROAT QL IA.RAPID: NEGATIVE

## 2025-04-21 PROCEDURE — 99213 OFFICE O/P EST LOW 20 MIN: CPT

## 2025-04-21 PROCEDURE — 99212 OFFICE O/P EST SF 10 MIN: CPT

## 2025-04-21 PROCEDURE — 87651 STREP A DNA AMP PROBE: CPT | Performed by: STUDENT IN AN ORGANIZED HEALTH CARE EDUCATION/TRAINING PROGRAM

## 2025-04-21 NOTE — ED INITIAL ASSESSMENT (HPI)
Pt complaining of sore throat with white patches and a headache starting Friday. Pt states she is prone to strep, but also just started a new asthma medication so concerned also for thrush.

## 2025-04-21 NOTE — ED PROVIDER NOTES
Patient Seen in: [facility]      History     Chief Complaint   Patient presents with    Sore Throat     Stated Complaint: Cold - White patches on tonsils for three days with pain. 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  Overall, she feels her symptoms have been improving.    Objective:     No pertinent past medical history.            No pertinent past surgical history.              No pertinent social history.            Review of Systems    Positive for stated complaint: Cold - White patches on tonsils for three days with pain. Patches are slowly go*  Other systems are as noted in HPI.  Constitutional and vital signs reviewed.      All other systems reviewed and negative except as noted above.                  Physical Exam     ED Triage Vitals [04/21/25 1550]   /80   Pulse 81   Resp 18   Temp 98.1 °F (36.7 °C)   Temp src Oral   SpO2 100 %   O2 Device None (Room air)       Current Vitals:   Vital Signs  BP: 115/80  Pulse: 81  Resp: 18  Temp: 98.1 °F (36.7 °C)  Temp src: Oral    Oxygen Therapy  SpO2: 100 %  O2 Device: None (Room air)        Physical Exam    General: Awake, alert, comfortable on room air, in no distress, tolerating oral secretions, interactive  Pulmonary: Lungs CTA B, no wheezing, no conversational dyspnea  GI: Abdomen soft, nontender, nondistended, no rebound, no guarding, no hepatomegaly, no splenomegaly  Neuro: Symmetrical facial expressions on gross observation  HEENT: No periorbital edema or erythema, nonerythematous and nonedematous intact B/L TMs,  no erythema or edema of the B/L ear canals, mild nasal congestion is present, erythematous but nonedematous B/L tonsils, no tonsillar exudates, no peritonsillar edema, mild posterior pharyngeal cobblestoning, uvula midline, tolerating oral secretions, normal speech, no submandibular edema  Neck: No anterior or posterior cervical lymphadenopathy  Psych: Normal mood, normal affect    ED Course     Labs Reviewed   RAPID STREP A - Normal       MDM   Patient is awake, alert, comfortable on room air, in no distress, afebrile, lungs CTAB with no wheezing with no sign of acute bronchospasm at this time, no sign of otitis media or otitis externa, she does have nasal drainage present on exam with postnasal drainage present consistent with likely underlying viral infection, she also has erythematous tonsils which could be consistent with early bacterial tonsillitis 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 and has history of strep pharyngitis and therefore also consider potential bacterial etiology, no sign of PTA or deep space infection at this time, no appreciated thrush throughout the oral cavity, no hepatomegaly or splenomegaly  -Patient's strep test resulted as negative  -We discussed likely underlying viral etiology, patient declines mono testing at this time, symptoms are have reportedly improved significantly since initial onset  -We discussed symptomatic management with rest, hydration, and over-the-counter Tylenol or ibuprofen if needed for pain control as long as no contraindications  -We discussed that viral infections can make her susceptible to secondary bacterial infections, and therefore with any new, changing, or progressing signs or symptoms, I did recommend immediate reassessment by her primary care physician  -Currently, no signs of complication with no sign of PTA or deep space infection, but ED precautions discussed      Medical Decision Making  Amount and/or Complexity of  Data Reviewed  Labs: ordered.    Risk  OTC drugs.        Disposition and Plan     Clinical Impression:  1. Tonsillitis         Disposition:  Discharge  4/21/2025  4:17 pm    Follow-up:  Yoanna Craig DO  [address] [place] [id number] [phone]    In 3 days  As needed, If symptoms worsen          Medications Prescribed:  Discharge Medication List as of 4/21/2025  4:20 PM          None

## 2025-04-21 NOTE — DISCHARGE INSTRUCTIONS
Your strep test is negative, there can be false negatives, early in the clinical course, as well as symptoms could be due to viral infection as you did state your symptoms are improving, but you could be susceptible to secondary Infections as well as to complications.  Therefore, with any new, changing, or progressing signs or symptoms, I recommend immediate reassessment.    If you develop any inability to eat or drink, drooling, neck pain, or voice changes, I recommend immediate assessment in the emergency department.

## (undated) NOTE — LETTER
Date & Time: 2/27/2024, 3:39 PM  Patient: Ne Calzada  Encounter Provider(s):    Marc Sharma PA       To Whom It May Concern:    Ne Calzada was seen and treated in our department on 2/27/2024. She should not return to work until Thursday  .    If you have any questions or concerns, please do not hesitate to call.      Marc Sharma   _____________________________  Physician/[facility] Signature